# Patient Record
Sex: FEMALE | Race: WHITE | Employment: OTHER | URBAN - METROPOLITAN AREA
[De-identification: names, ages, dates, MRNs, and addresses within clinical notes are randomized per-mention and may not be internally consistent; named-entity substitution may affect disease eponyms.]

---

## 2018-09-02 ENCOUNTER — APPOINTMENT (OUTPATIENT)
Dept: GENERAL RADIOLOGY | Age: 83
End: 2018-09-02
Attending: EMERGENCY MEDICINE
Payer: MEDICARE

## 2018-09-02 ENCOUNTER — HOSPITAL ENCOUNTER (EMERGENCY)
Age: 83
Discharge: ANOTHER HOSPITAL | End: 2018-09-02
Attending: EMERGENCY MEDICINE
Payer: MEDICARE

## 2018-09-02 ENCOUNTER — APPOINTMENT (OUTPATIENT)
Dept: CT IMAGING | Age: 83
End: 2018-09-02
Attending: EMERGENCY MEDICINE
Payer: MEDICARE

## 2018-09-02 VITALS
HEIGHT: 64 IN | DIASTOLIC BLOOD PRESSURE: 67 MMHG | HEART RATE: 67 BPM | TEMPERATURE: 97.9 F | RESPIRATION RATE: 15 BRPM | OXYGEN SATURATION: 100 % | WEIGHT: 118 LBS | SYSTOLIC BLOOD PRESSURE: 175 MMHG | BODY MASS INDEX: 20.14 KG/M2

## 2018-09-02 DIAGNOSIS — I10 HYPERTENSION, UNSPECIFIED TYPE: ICD-10-CM

## 2018-09-02 DIAGNOSIS — R42 DIZZINESS: Primary | ICD-10-CM

## 2018-09-02 DIAGNOSIS — I71.20 THORACIC AORTIC ANEURYSM WITHOUT RUPTURE: ICD-10-CM

## 2018-09-02 LAB
ALBUMIN SERPL-MCNC: 4 G/DL (ref 3.4–5)
ALBUMIN/GLOB SERPL: 1.1 {RATIO} (ref 0.8–1.7)
ALP SERPL-CCNC: 79 U/L (ref 45–117)
ALT SERPL-CCNC: 15 U/L (ref 13–56)
ANION GAP SERPL CALC-SCNC: 11 MMOL/L (ref 3–18)
APPEARANCE UR: CLEAR
AST SERPL-CCNC: 14 U/L (ref 15–37)
BASOPHILS # BLD: 0 K/UL (ref 0–0.1)
BASOPHILS NFR BLD: 0 % (ref 0–2)
BILIRUB SERPL-MCNC: 0.8 MG/DL (ref 0.2–1)
BILIRUB UR QL: NEGATIVE
BUN SERPL-MCNC: 9 MG/DL (ref 7–18)
BUN/CREAT SERPL: 11 (ref 12–20)
CALCIUM SERPL-MCNC: 9.3 MG/DL (ref 8.5–10.1)
CHLORIDE SERPL-SCNC: 98 MMOL/L (ref 100–108)
CK MB CFR SERPL CALC: 5.3 % (ref 0–4)
CK MB SERPL-MCNC: 3.2 NG/ML (ref 5–25)
CK SERPL-CCNC: 60 U/L (ref 26–192)
CO2 SERPL-SCNC: 26 MMOL/L (ref 21–32)
COLOR UR: YELLOW
CREAT SERPL-MCNC: 0.84 MG/DL (ref 0.6–1.3)
DIFFERENTIAL METHOD BLD: ABNORMAL
EOSINOPHIL # BLD: 0 K/UL (ref 0–0.4)
EOSINOPHIL NFR BLD: 0 % (ref 0–5)
ERYTHROCYTE [DISTWIDTH] IN BLOOD BY AUTOMATED COUNT: 14.3 % (ref 11.6–14.5)
GLOBULIN SER CALC-MCNC: 3.7 G/DL (ref 2–4)
GLUCOSE SERPL-MCNC: 134 MG/DL (ref 74–99)
GLUCOSE UR STRIP.AUTO-MCNC: NEGATIVE MG/DL
HCT VFR BLD AUTO: 41.9 % (ref 35–45)
HGB BLD-MCNC: 13.9 G/DL (ref 12–16)
HGB UR QL STRIP: NEGATIVE
KETONES UR QL STRIP.AUTO: NEGATIVE MG/DL
LEUKOCYTE ESTERASE UR QL STRIP.AUTO: NEGATIVE
LYMPHOCYTES # BLD: 1.1 K/UL (ref 0.9–3.6)
LYMPHOCYTES NFR BLD: 17 % (ref 21–52)
MAGNESIUM SERPL-MCNC: 1.9 MG/DL (ref 1.6–2.6)
MCH RBC QN AUTO: 32 PG (ref 24–34)
MCHC RBC AUTO-ENTMCNC: 33.2 G/DL (ref 31–37)
MCV RBC AUTO: 96.3 FL (ref 74–97)
MONOCYTES # BLD: 0.4 K/UL (ref 0.05–1.2)
MONOCYTES NFR BLD: 7 % (ref 3–10)
NEUTS SEG # BLD: 4.9 K/UL (ref 1.8–8)
NEUTS SEG NFR BLD: 76 % (ref 40–73)
NITRITE UR QL STRIP.AUTO: NEGATIVE
PH UR STRIP: 6 [PH] (ref 5–8)
PLATELET # BLD AUTO: 269 K/UL (ref 135–420)
PMV BLD AUTO: 9.6 FL (ref 9.2–11.8)
POTASSIUM SERPL-SCNC: 4.2 MMOL/L (ref 3.5–5.5)
PROT SERPL-MCNC: 7.7 G/DL (ref 6.4–8.2)
PROT UR STRIP-MCNC: NEGATIVE MG/DL
RBC # BLD AUTO: 4.35 M/UL (ref 4.2–5.3)
SODIUM SERPL-SCNC: 135 MMOL/L (ref 136–145)
SP GR UR REFRACTOMETRY: 1.03 (ref 1–1.03)
TROPONIN I SERPL-MCNC: 0.05 NG/ML (ref 0–0.06)
UROBILINOGEN UR QL STRIP.AUTO: 0.2 EU/DL (ref 0.2–1)
WBC # BLD AUTO: 6.4 K/UL (ref 4.6–13.2)

## 2018-09-02 PROCEDURE — 80053 COMPREHEN METABOLIC PANEL: CPT | Performed by: EMERGENCY MEDICINE

## 2018-09-02 PROCEDURE — 74011636320 HC RX REV CODE- 636/320: Performed by: EMERGENCY MEDICINE

## 2018-09-02 PROCEDURE — 82550 ASSAY OF CK (CPK): CPT | Performed by: EMERGENCY MEDICINE

## 2018-09-02 PROCEDURE — 85025 COMPLETE CBC W/AUTO DIFF WBC: CPT | Performed by: EMERGENCY MEDICINE

## 2018-09-02 PROCEDURE — 71045 X-RAY EXAM CHEST 1 VIEW: CPT

## 2018-09-02 PROCEDURE — 93005 ELECTROCARDIOGRAM TRACING: CPT

## 2018-09-02 PROCEDURE — 74011000250 HC RX REV CODE- 250: Performed by: EMERGENCY MEDICINE

## 2018-09-02 PROCEDURE — 74011000258 HC RX REV CODE- 258: Performed by: EMERGENCY MEDICINE

## 2018-09-02 PROCEDURE — 71275 CT ANGIOGRAPHY CHEST: CPT

## 2018-09-02 PROCEDURE — 96374 THER/PROPH/DIAG INJ IV PUSH: CPT

## 2018-09-02 PROCEDURE — 96376 TX/PRO/DX INJ SAME DRUG ADON: CPT

## 2018-09-02 PROCEDURE — 81003 URINALYSIS AUTO W/O SCOPE: CPT | Performed by: EMERGENCY MEDICINE

## 2018-09-02 PROCEDURE — 99285 EMERGENCY DEPT VISIT HI MDM: CPT

## 2018-09-02 PROCEDURE — 83735 ASSAY OF MAGNESIUM: CPT | Performed by: EMERGENCY MEDICINE

## 2018-09-02 RX ORDER — LABETALOL HYDROCHLORIDE 5 MG/ML
10 INJECTION, SOLUTION INTRAVENOUS
Status: COMPLETED | OUTPATIENT
Start: 2018-09-02 | End: 2018-09-02

## 2018-09-02 RX ORDER — LABETALOL HYDROCHLORIDE 5 MG/ML
20 INJECTION, SOLUTION INTRAVENOUS
Status: COMPLETED | OUTPATIENT
Start: 2018-09-02 | End: 2018-09-02

## 2018-09-02 RX ADMIN — IOPAMIDOL 100 ML: 755 INJECTION, SOLUTION INTRAVENOUS at 13:59

## 2018-09-02 RX ADMIN — LABETALOL HYDROCHLORIDE 2 MG/MIN: 5 INJECTION INTRAVENOUS at 17:02

## 2018-09-02 RX ADMIN — LABETALOL HYDROCHLORIDE 10 MG: 5 INJECTION, SOLUTION INTRAVENOUS at 15:58

## 2018-09-02 RX ADMIN — LABETALOL HYDROCHLORIDE 20 MG: 5 INJECTION, SOLUTION INTRAVENOUS at 16:27

## 2018-09-02 NOTE — ED PROVIDER NOTES
EMERGENCY DEPARTMENT HISTORY AND PHYSICAL EXAM 
 
Date: 9/2/2018 Patient Name: Lani Woods History of Presenting Illness Chief Complaint Patient presents with  Dizziness  Jaw Pain History Provided By: Patient Chief Complaint: Facial pain Duration: 2 Hours Timing:  Intermittent Location: Neurological system, right side of face Quality: Stinging Severity: 4/10 Modifying Factors: No relieving or worsening factors Associated Symptoms: dizziness, jitteriness, and chills Additional History (Context):  
11:39 AM 
Lani Woods is a [de-identified] y.o. female with PMHX of arthritis who presents to the emergency department with her daughter C/O \"feeling strange\" with right-sided facial \"stinging,\" onset this morning s/p waking up and experiencing a near-syncopal episode. Associated sxs include dizziness, jitteriness, resolved diaphoresis, and chills. Pt's daughter reports that the pt's facial symmetry appears to be at baseline. Pt reports that she only takes Tylenol and ASA as needed. Denies taking any medications today. Denies hx of cardiac/pulmonary issues. Pt denies CP, SOB, and any other sxs or complaints. PCP: Franc Novak MD 
 
 
 
Past History Past Medical History: 
Past Medical History:  
Diagnosis Date  RA (rheumatoid arthritis) ([facility] [address] Past Surgical History: No past surgical history on file. Family History: No family history on file. Social History: 
Social History Substance Use Topics  Smoking status: Not on file  Smokeless tobacco: Not on file  Alcohol use Not on file Allergies: 
No Known Allergies Review of Systems Review of Systems Constitutional: Positive for chills and diaphoresis. HENT:  
     Right sided facial \"stinging\" Respiratory: Negative for shortness of breath. Cardiovascular: Negative for chest pain. Neurological: Positive for dizziness. Negative for weakness and numbness. All other systems reviewed and are negative. Physical Exam  
 
Vitals:  
 09/02/18 1558 09/02/18 1624 09/02/18 1627 09/02/18 1705 BP: (!) 215/70 198/73 198/73 175/67 Pulse: 99 95 93 67 Resp:  14  15 Temp:      
SpO2:  99%  100% Weight:      
Height:      
 
Physical Exam  
Nursing note and vitals reviewed. Constitutional: Elderly and frail, mild distress Head: Normocephalic, Atraumatic Eyes: Pupils are equal, round, and reactive to light, EOMI Neck: Supple, non-tender Cardiovascular: Irregularly irregular rate and rhythm, no murmurs, rubs, or gallops Chest: Normal work of breathing and chest excursion bilaterally Lungs: Clear to ausculation bilaterally Abdomen: Soft, non tender, non distended, normoactive bowel sounds Back: No evidence of trauma or deformity Extremities: No evidence of trauma or deformity, no LE edema Skin: Warm and dry, normal cap refill Neuro: Alert and appropriate, CN intact, normal speech, strength and sensation full and symmetric bilaterally, normal gait, normal coordination Psychiatric: Anxious mood and affect Diagnostic Study Results Labs - Recent Results (from the past 12 hour(s)) EKG, 12 LEAD, INITIAL Collection Time: 09/02/18 11:39 AM  
Result Value Ref Range Ventricular Rate 97 BPM  
 Atrial Rate 87 BPM  
 P-R Interval 232 ms QRS Duration 86 ms  
 Q-T Interval 380 ms QTC Calculation (Bezet) 482 ms Calculated P Axis 88 degrees Calculated R Axis -39 degrees Calculated T Axis 81 degrees Diagnosis Sinus rhythm with 1st degree AV block with premature atrial complexes and  
premature ventricular complexes or fusion complexes Left axis deviation T wave abnormality, consider lateral ischemia Prolonged QT Abnormal ECG No previous ECGs available CBC WITH AUTOMATED DIFF Collection Time: 09/02/18 11:55 AM  
Result Value Ref Range WBC 6.4 4.6 - 13.2 K/uL  
 RBC 4.35 4.20 - 5.30 M/uL  
 HGB 13.9 12.0 - 16.0 g/dL HCT 41.9 35.0 - 45.0 % MCV 96.3 74.0 - 97.0 FL  
 MCH 32.0 24.0 - 34.0 PG  
 MCHC 33.2 31.0 - 37.0 g/dL  
 RDW 14.3 11.6 - 14.5 % PLATELET 766 635 - 123 K/uL MPV 9.6 9.2 - 11.8 FL  
 NEUTROPHILS 76 (H) 40 - 73 % LYMPHOCYTES 17 (L) 21 - 52 % MONOCYTES 7 3 - 10 % EOSINOPHILS 0 0 - 5 % BASOPHILS 0 0 - 2 %  
 ABS. NEUTROPHILS 4.9 1.8 - 8.0 K/UL  
 ABS. LYMPHOCYTES 1.1 0.9 - 3.6 K/UL  
 ABS. MONOCYTES 0.4 0.05 - 1.2 K/UL  
 ABS. EOSINOPHILS 0.0 0.0 - 0.4 K/UL  
 ABS. BASOPHILS 0.0 0.0 - 0.1 K/UL  
 DF AUTOMATED METABOLIC PANEL, COMPREHENSIVE Collection Time: 09/02/18 11:55 AM  
Result Value Ref Range Sodium 135 (L) 136 - 145 mmol/L Potassium 4.2 3.5 - 5.5 mmol/L Chloride 106 100 - 108 mmol/L  
 CO2 26 21 - 32 mmol/L Anion gap 3 3.0 - 18 mmol/L Glucose 134 (H) 74 - 99 mg/dL BUN 9 7.0 - 18 MG/DL Creatinine 0.84 0.6 - 1.3 MG/DL  
 BUN/Creatinine ratio 11 (L) 12 - 20 GFR est AA >60 >60 ml/min/1.73m2 GFR est non-AA >60 >60 ml/min/1.73m2 Calcium 9.3 8.5 - 10.1 MG/DL Bilirubin, total 0.8 0.2 - 1.0 MG/DL  
 ALT (SGPT) 15 13 - 56 U/L  
 AST (SGOT) 14 (L) 15 - 37 U/L Alk. phosphatase 79 45 - 117 U/L Protein, total 7.7 6.4 - 8.2 g/dL Albumin 4.0 3.4 - 5.0 g/dL Globulin 3.7 2.0 - 4.0 g/dL A-G Ratio 1.1 0.8 - 1.7 MAGNESIUM Collection Time: 09/02/18 11:55 AM  
Result Value Ref Range Magnesium 1.9 1.6 - 2.6 mg/dL CARDIAC PANEL,(CK, CKMB & TROPONIN) Collection Time: 09/02/18 11:55 AM  
Result Value Ref Range CK 60 26 - 192 U/L  
 CK - MB 3.2 <3.6 ng/ml CK-MB Index 5.3 (H) 0.0 - 4.0 % Troponin-I, Qt. 0.05 0.00 - 0.06 NG/ML  
URINALYSIS W/ RFLX MICROSCOPIC Collection Time: 09/02/18  2:20 PM  
Result Value Ref Range Color YELLOW Appearance CLEAR Specific gravity 1.026 1.005 - 1.030    
 pH (UA) 6.0 5.0 - 8.0 Protein NEGATIVE  NEG mg/dL Glucose NEGATIVE  NEG mg/dL Ketone NEGATIVE  NEG mg/dL Bilirubin NEGATIVE  NEG Blood NEGATIVE  NEG Urobilinogen 0.2 0.2 - 1.0 EU/dL Nitrites NEGATIVE  NEG Leukocyte Esterase NEGATIVE  NEG Radiologic Studies -  
CTA CHEST ABD PELV W CONT Final Result IMPRESSION: 
 
 
1. Aneurysmal dilatation of the ascending thoracic aorta without evidence of 
acute aortic pathology. Specifically, no evidence of dissection or intramural 
hematoma. Mild atherosclerosis. 2. Incidental simple appearing cystic structure in right adnexa, nonspecific 
measuring up to 3.4 cm. Given patient's age, recommend follow-up nonemergent 
pelvic ultrasound for further characterization. Trace free fluid in the pelvis 
may be physiologic. 3. No acute inflammatory process in the chest, abdomen, or pelvis. As read by the radiologist.  
XR CHEST PORT Final Result Impression: 
Cardiomegaly which has developed since the remote study from 2006. No acute 
cardiopulmonary abnormality. As read by the radiologist.  
 
CT Results  (Last 48 hours) 09/02/18 1402  CTA CHEST ABD PELV W CONT Final result Impression:  IMPRESSION:  
   
   
1. Aneurysmal dilatation of the ascending thoracic aorta without evidence of  
acute aortic pathology. Specifically, no evidence of dissection or intramural  
hematoma. Mild atherosclerosis. 2. Incidental simple appearing cystic structure in right adnexa, nonspecific  
measuring up to 3.4 cm. Given patient's age, recommend follow-up nonemergent  
pelvic ultrasound for further characterization. Trace free fluid in the pelvis  
may be physiologic. 3. No acute inflammatory process in the chest, abdomen, or pelvis. Narrative:  CTA  aorta dissection History:  chest pain, suspected aortic aneurysm or dissection CT A technique:   
   
Initial noncontrast CT through the chest abdomen and pelvis was performed. Serial axial helical thin section high bolus rate contrast enhanced CT performed from above the aortic arch to the abdomen and pelvis with  iodine containing  
nonionic IV contrast. Sagittal and coronal thin slice and MIP reformations were  
also performed from axial data to best evaluate the aorta and its longitudinal  
relationship to other critical vascular structures and branches. Protocol  
designed to optimally evaluate the thoracic aorta in this setting of trauma or  
suspected dissection. One or more dose reduction techniques were used on this CT: automated exposure control, adjustment of the mAs and/or kVp according to  
patient's size, and iterative reconstruction techniques. The specific techniques  
utilized on this CT exam have been documented in the patient's electronic  
medical record. CTA findings:  
   
No evidence of mediastinal or intramural hematoma. Mild coronary artery  
calcifications are noted in addition to faint calcifications along the aortic  
valve plane and mitral valve annulus. Minimal aortic atherosclerotic  
calcifications are present. Aneurysmal dilatation of the ascending aorta  
measures 5.1 x 4.9 cm. The descending thoracic aorta measures 2.7 cm at the same  
location. There is no abnormal wall thickening, filling defects, or dissection  
flap within the aorta. The aorta is tortuous as are the origins of the great  
vessels. The major vasculature is patent. Heart is enlarged without pericardial  
effusion. Suboptimal opacification of the pulmonary arteries and right heart  
however, diameter of the main pulmonary artery is within normal limits.  
   
 ==========  
   
ADDITIONAL FINDINGS:  
   
------------------  
   
CHEST:  
   
LUNGS: No suspicious pulmonary nodule/mass/opacity. Mild dependent atelectasis  
predominantly in the left lung base and lingula related to the enlarged heart  
and tortuous aorta. Small band of subsegmental atelectasis in the right middle  
lobe is also noted PLEURA: Normal.  
   
AIRWAY: Normal.  
   
 MEDIASTINUM: Normal heart and great vessels. Multiple calcified subcarinal and  
right perihilar mediastinal lymph nodes are noted compatible with chronic  
granulomatous disease. OTHER: No aggressive appearing osseous lesion.  
   
------------------  
   
ABDOMEN/PELVIS:  
   
LIVER/BILIARY: No suspicious liver lesion. No biliary dilation. Gallbladder  
unremarkable. SPLEEN: Normal.  
   
PANCREAS: Prominent main pancreatic duct is noted measuring 2-3 mm. No discrete  
mass lesion is appreciated. ADRENALS: Normal.  
   
KIDNEYS: Normal.  
   
LYMPH NODES: No technically enlarged nodes. GI TRACT: No wall thickening or dilation. Incidental duodenal diverticulum is  
air-filled. VASCULATURE: Major vessels are patent. Tortuous aorta and iliac vessels. Mild  
scattered atherosclerosis. PELVIC ORGANS: Incidental 3.4 x 2.6 cm simple appearing cystic structure in  
right adnexa may be ovarian in etiology. Trace free fluid in the pelvis. OTHER: No aggressive appearing osseous lesion. Bones are osteopenic. Degenerative changes are noted throughout the spine and pelvis. Mild levoconvex  
curvature of the thoracolumbar spine centered at T12-L1. There is an osseous  
hemangioma in the T11 vertebral body. Prominent Schmorl's node along the  
superior endplate of L2.  
   
==========  
   
  
  
 
CXR Results  (Last 48 hours) 09/02/18 1205  XR CHEST PORT Final result Impression:  Impression:  
Cardiomegaly which has developed since the remote study from 2006. No acute  
cardiopulmonary abnormality. Narrative:  Chest, single view Indication: Arrhythmia Comparison: July 27, 2006 Findings:  Portable upright AP view of the chest was obtained. The  
cardiomediastinal silhouette is enlarged. The lungs are well expanded and clear. The pulmonary vasculature is unremarkable. No pleural effusion nor  
pneumothorax. No acute osseous abnormality. 1:08 PM 
RADIOLOGY FINDINGS Chest X-ray shows poor penetration but NAP. Mediastinum looked wider than prior. Pending review by Radiologist 
Recorded by Palak Mcnally, ED Scribe, as dictated by Marychuy Vaughn MD 
 
Medications given in the ED- Medications  
labetalol (NORMODYNE;TRANDATE) 300 mg in 0.9% sodium chloride 150 mL (2 mg / 1 mL) infusion (2 mg/min IntraVENous New Bag 9/2/18 1702) iopamidol (ISOVUE-370) 76 % injection  mL (100 mL IntraVENous Given 9/2/18 1359)  
labetalol (NORMODYNE;TRANDATE) injection 10 mg (10 mg IntraVENous Given 9/2/18 1558)  
labetalol (NORMODYNE;TRANDATE) injection 20 mg (20 mg IntraVENous Given 9/2/18 1627) Medical Decision Making I am the first provider for this patient. I reviewed the vital signs, available nursing notes, past medical history, past surgical history, family history and social history. Vital Signs-Reviewed the patient's vital signs. Pulse Oximetry Analysis - 99% on RA  
 
EKG interpretation: (Preliminary) 11:37 AM  
97 bpm. Sinus rhythm with 1st degree AV block. QRS duration at 86 ms. EKG read by Marychuy Vaughn MD at 11:44 AM  
 
Records Reviewed: Nursing Notes and Old Medical Records Procedures: 
Procedures ED Course:  
11:39 AM Initial assessment performed. The patients presenting problems have been discussed, and they are in agreement with the care plan formulated and outlined with them. I have encouraged them to ask questions as they arise throughout their visit. 2:12 PM Discussed patient's history, exam, and available diagnostics results with Cherri Menezes MD, cardiology, who will evaluate EKG. 2:20 PM Discussed patient's history, exam, and available diagnostics results with Kole Hill MD, cardiology, who agree with checking electrolytes. EKG shows only PVCs and some ectopy.  
 
3:38 PM Discussed patient's history, exam, and available diagnostics results with Dr. Mainor Cramer, vascular surgery, who recommends consulting with cardiothoracic surgey. 3:50 PM Discussed patient's history, exam, and available diagnostics results with Dr. Randa Nance, cardiothoracic surgery, who agrees with transfer to [place] for heart rate control. Will discuss with hospitalist. 
 
Diagnosis and Disposition DISCUSSION: [de-identified] y/o female presenting w/ jaw pain and near syncopal episode found to be hypertensive with a wide mediastinum on CXR and a CTA that revealed thoracic aortic aneurysm. Initiated IV blood pressure control and discussed with cardoithoracic surgery at [place] who recommended transfer for blood pressure control. Pt remains asymtomatic and understands and agrees with plan for transfer to [place]. CONSULT NOTE:  
4:25 PM 
Merry Nichols MD spoke with Doni Hernandez MD 
Specialty: Hospitalist 
Discussed pt's hx, disposition, and available diagnostic and imaging results. Reviewed care plans. Consulting physician agrees with plans as outlined. Will admit pt to CCU. Written by Joe Tejeda ED Scribe, as dictated by Merry Nichols MD 
 
ADMISSION NOTE: 
4:25 PM 
Patient is being admitted to the hospital by Doni Hernandez MD. The results of their tests and reasons for their admission have been discussed with them and/or available family. They convey agreement and understanding for the need to be admitted and for their admission diagnosis. Written by Joe Tejeda ED Scribe, as dictated by Merry Nichols MD 
 
CONDITIONS ON ADMISSION: 
Sepsis is not present at the time of admission. Deep Vein Thrombosis is not present at the time of admission. Thrombosis is not present at the time of admission. Urinary Tract Infection is not present at the time of admission. Pneumonia is not present at the time of admission. MRSA is not present at the time of admission. Wound infection is not present at the time of admission. Pressure Ulcer is not present at the time of admission. 4:28 PM 
I have spent 54 minutes of critical care time involved in lab review, consultations with specialist, family decision-making, and documentation. During this entire length of time I was immediately available to the patient. Critical Care: The reason for providing this level of medical care for this critically ill patient was due a critical illness that impaired one or more vital organ systems such that there was a high probability of imminent or life threatening deterioration in the patients condition. This care involved high complexity decision making to assess, manipulate, and support vital system functions, to treat this degreee vital organ system failure and to prevent further life threatening deterioration of the patients condition. CLINICAL IMPRESSION 1. Dizziness 2. Thoracic aortic aneurysm without rupture ([address] 3. Hypertension, unspecified type   
 
_______________________________ Attestations: This note is prepared by Elyse Hooker, acting as Scribe for Stan Lambert MD. Stan Lambert MD:  The scribe's documentation has been prepared under my direction and personally reviewed by me in its entirety. I confirm that the note above accurately reflects all work, treatment, procedures, and medical decision making performed by me. 
_______________________________

## 2018-09-02 NOTE — ED NOTES
Pt hourly rounding competed. Safety Pt (x) resting on stretcher with side rails up and call bell in reach on continuous monitoring  
  () in chair 
  () in parents arms. Toileting Pt offered ()Bedpan 
   (x)Assistance to Restroom 
   ()Urinal 
Ongoing Updates Updated on plan of care and status of test results. Pain Management Inquired as to comfort and offered comfort measures: 
  (x) warm blankets 
 () dimmed lights Pt unable to void at this time provided w/ ice water, cleared w/ MD prior to. Pt confirmed she will ring call bell when ready to provide urine sample.

## 2018-09-02 NOTE — ED NOTES
Pt placed in chair in sitting position per request due to chronic lower back pain. Pt reports pain relief while in sitting position. Pt remains on continuous monitoring w/ family at the bedside.

## 2018-09-02 NOTE — ED TRIAGE NOTES
Triage: pt awoke with dizziness this morning, lasted approx 15 minutes. Daughter reports that she was diaphoretic. Pt also complains of right jaw pain.

## 2018-09-03 LAB
ATRIAL RATE: 87 BPM
CALCULATED P AXIS, ECG09: 88 DEGREES
CALCULATED R AXIS, ECG10: -39 DEGREES
CALCULATED T AXIS, ECG11: 81 DEGREES
DIAGNOSIS, 93000: NORMAL
P-R INTERVAL, ECG05: 232 MS
Q-T INTERVAL, ECG07: 380 MS
QRS DURATION, ECG06: 86 MS
QTC CALCULATION (BEZET), ECG08: 482 MS
VENTRICULAR RATE, ECG03: 97 BPM

## 2019-01-19 ENCOUNTER — HOSPITAL ENCOUNTER (EMERGENCY)
Age: 84
Discharge: HOME | End: 2019-01-19
Attending: EMERGENCY MEDICINE
Payer: MEDICARE

## 2019-01-19 VITALS
BODY MASS INDEX: 20.91 KG/M2 | RESPIRATION RATE: 18 BRPM | HEIGHT: 63 IN | OXYGEN SATURATION: 97 % | HEART RATE: 101 BPM | SYSTOLIC BLOOD PRESSURE: 170 MMHG | TEMPERATURE: 99.6 F | WEIGHT: 118 LBS | DIASTOLIC BLOOD PRESSURE: 78 MMHG

## 2019-01-19 DIAGNOSIS — R04.0 EPISTAXIS: Primary | ICD-10-CM

## 2019-01-19 PROCEDURE — 99283 EMERGENCY DEPT VISIT LOW MDM: CPT

## 2019-01-19 NOTE — ED TRIAGE NOTES
C/o nose bleed since approx 2p today. Family member states pt had nosebleed last Saturday, EMS arrived and after approx 45 minutes bleeding stopped then started again and pt was seen at [facility] ED, bleeding stopped with afrin. Pt seen this week by her family doctor.

## 2019-01-19 NOTE — ED PROVIDER NOTES
EMERGENCY DEPARTMENT HISTORY AND PHYSICAL EXAM 
 
Date: 1/19/2019 Patient Name: Peng Forbes History of Presenting Illness Chief Complaint Patient presents with  Epistaxis History Provided By: Patient Chief Complaint: Epistaxis Duration: 1 Weeks Timing:  Intermittent Location: Nose Severity: Moderate Associated Symptoms: denies any other associated signs or symptoms Additional History (Context):  
6:36 PM 
Peng Forbes is a 82 y.o. female with PMHX aortic aneurysm, HTN, and RA presents to the emergency department C/O intermittent epistaxis, onset 1 week ago with most recent episode beginning 4.5 hours ago. No other associated sxs. Daughter states that pt was seen at [facility] 1 week ago for the first episode of epistaxis, which resolved with Afrin, and pt was d/c with PCP f/u. Pt states she was doing fine until the most recent episode of epistaxis began. Pt denies trauma/injury, use of blood thinners including ASA, and any other sxs or complaints. PCP: Katelyn Vanegas MD 
 
Current Outpatient Medications Medication Sig Dispense Refill  LISINOPRIL, BULK, by Does Not Apply route.  METOPROLOL SUCCINATE PO Take  by mouth. Past History Past Medical History: 
Past Medical History:  
Diagnosis Date  Aortic aneurysm ([facility] [address]  At risk for falls  Fall  Hypertension  Nosebleed  RA (rheumatoid arthritis) ([facility] [address] Past Surgical History: 
Past Surgical History:  
Procedure Laterality Date  HX GYN    
 tubal ligation  HX ORTHOPAEDIC    
 total knee replacement  HX TONSILLECTOMY Family History: No family history on file. Social History: 
Social History Tobacco Use  Smoking status: Not on file Substance Use Topics  Alcohol use: Not on file  Drug use: Not on file Allergies: 
No Known Allergies Review of Systems Review of Systems Constitutional: Negative for chills and fever. HENT: Positive for nosebleeds. All other systems reviewed and are negative. Physical Exam  
 
Vitals:  
 01/19/19 1830 01/19/19 1900 BP: 130/81 170/78 Pulse: (!) 101 Resp: 18 Temp: 99.6 °F (37.6 °C) SpO2: 95% 97% Weight: 53.5 kg (118 lb) Height: 5' 3\" (1.6 m) Physical Exam  
Nursing note and vitals reviewed. Vital signs and nursing notes reviewed CONSTITUTIONAL: Alert, in no apparent distress; well-developed; well-nourished. HEAD:  Normocephalic, atraumatic EYES: PERRL; EOM's intact. ENTM: Nose: area of bleeding at [address] plexus on the right; Throat: no erythema or exudate, mucous membranes moist 
Neck:  No JVD, supple without lymphadenopathy RESP: Chest clear, equal breath sounds. CV: S1 and S2 WNL; No murmurs, gallops or rubs. GI: Normal bowel sounds, abdomen soft and non-tender. No masses or organomegaly. : No costo-vertebral angle tenderness. BACK:  Non-tender UPPER EXT:  Normal inspection LOWER EXT: No edema, no calf tenderness. Distal pulses intact. NEURO: CN intact, reflexes 2/4 and sym, strength 5/5 and sym, sensation intact. SKIN: No rashes; Normal for age and stage. PSYCH:  Alert and oriented, normal affect. Diagnostic Study Results Labs - No results found for this or any previous visit (from the past 12 hour(s)). Radiologic Studies - No orders to display CT Results  (Last 48 hours) None CXR Results  (Last 48 hours) None Medical Decision Making I am the first provider for this patient. I reviewed the vital signs, available nursing notes, past medical history, past surgical history, family history and social history. Vital Signs-Reviewed the patient's vital signs. Pulse Oximetry Analysis - 95% on RA Records Reviewed: Nursing Notes Provider Notes (Medical Decision Making): DDX: Epistaxis, anticoagulation Procedures: 
Procedures ED Course: 6:36 PM Initial assessment performed. The patients presenting problems have been discussed, and they are in agreement with the care plan formulated and outlined with them. I have encouraged them to ask questions as they arise throughout their visit. Applied pressure to the nose with clamp. 7:39 PM Reassessed pt. Removed nose pinchers. Pt does not appear to be actively bleeding. 8:05 PM Epistaxis resolved. Pt remained hemostatic 20 minutes after removing clamp. Will discharge home. Discussion: Perry Graff is a 82 y.o. female presenting for recurrent epistaxis with most recent this afternoon. Clamp placed with resolution of epistaxis. Pt remained hemostatic 20 minutes after removing clamp. Pt appropriate for d/c with ENT f/u. Diagnosis and Disposition DISCHARGE NOTE: 
8:05 PM 
Latasha Harvey's  results have been reviewed with her. She has been counseled regarding her diagnosis, treatment, and plan. She verbally conveys understanding and agreement of the signs, symptoms, diagnosis, treatment and prognosis and additionally agrees to follow up as discussed. She also agrees with the care-plan and conveys that all of her questions have been answered. I have also provided discharge instructions for her that include: educational information regarding their diagnosis and treatment, and list of reasons why they would want to return to the ED prior to their follow-up appointment, should her condition change. She has been provided with education for proper emergency department utilization. CLINICAL IMPRESSION: 
 
1. Epistaxis PLAN: 
1. D/C Home 2. Current Discharge Medication List  
  
CONTINUE these medications which have NOT CHANGED Details LISINOPRIL, BULK, by Does Not Apply route. METOPROLOL SUCCINATE PO Take  by mouth. 3.  
Follow-up Information Follow up With Specialties Details Why Contact Info Aleshia Torres MD Otolaryngology, Surgery Schedule an appointment as soon as possible for a visit in 2 days ENT follow up. [address] [id number] 
[phone] [facility] EMERGENCY DEPT Emergency Medicine  As needed, if symptoms worsen [address] 
[facility] [id number] 
[phone]  
  
 
_______________________________ Attestations:  
 
SCRIBE ATTESTATION: 
This note is prepared by Bee Paul, acting as Scribe for Addie Tierney MD. 
 
PROVIDER ATTESTATION: 
Addie Tierney MD: The scribe's documentation has been prepared under my direction and personally reviewed by me in its entirety. I confirm that the note above accurately reflects all work, treatment, procedures, and medical decision making performed by me.  
_______________________________

## 2019-01-20 NOTE — ED NOTES
Pt ambulated from bathroom to room with an even, steady gait. No bleeding/epistaxis observed at this time.

## 2019-01-20 NOTE — DISCHARGE INSTRUCTIONS
Nosebleeds: Care Instructions  Your Care Instructions    Nosebleeds are common, especially if you have colds or allergies. Many things can cause a nosebleed. Some nosebleeds stop on their own with pressure. Others need packing. Some get cauterized (sealed). If you have gauze or other packing materials in your nose, you will need to follow up with your doctor to have the packing removed. You may need more treatment if you get nosebleeds a lot. The doctor has checked you carefully, but problems can develop later. If you notice any problems or new symptoms, get medical treatment right away. Follow-up care is a key part of your treatment and safety. Be sure to make and go to all appointments, and call your doctor if you are having problems. It's also a good idea to know your test results and keep a list of the medicines you take. How can you care for yourself at home? · If you get another nosebleed:  ? Sit up and tilt your head slightly forward. This keeps blood from going down your throat. ? Use your thumb and index finger to pinch your nose shut for 10 minutes. Use a clock. Do not check to see if the bleeding has stopped before the 10 minutes are up. If the bleeding has not stopped, pinch your nose shut for another 10 minutes. ? When the bleeding has stopped, try not to pick, rub, or blow your nose for 12 hours. Avoiding these things helps keep your nose from bleeding again. · If your doctor prescribed antibiotics, take them as directed. Do not stop taking them just because you feel better. You need to take the full course of antibiotics. To prevent nosebleeds  · Do not blow your nose too hard. · Try not to lift or strain after a nosebleed. · Raise your head on a pillow while you sleep. · Put a thin layer of a saline- or water-based nasal gel, such as NasoGel, inside your nose. Put it on the septum, which divides your nostrils. This will prevent dryness that can cause nosebleeds.   · Use a vaporizer or humidifier to add moisture to your bedroom. Follow the directions for cleaning the machine. · Do not use aspirin, ibuprofen (Advil, Motrin), or naproxen (Aleve) for 36 to 48 hours after a nosebleed unless your doctor tells you to. You can use acetaminophen (Tylenol) for pain relief. · Talk to your doctor about stopping any other medicines you are taking. Some medicines may make you more likely to get a nosebleed. · Do not use cold medicines or nasal sprays without first talking to your doctor. They can make your nose dry. When should you call for help? Call 911 anytime you think you may need emergency care. For example, call if:    · You passed out (lost consciousness).    Call your doctor now or seek immediate medical care if:    · You get another nosebleed and your nose is still bleeding after you have applied pressure 3 times for 10 minutes each time (30 minutes total).     · There is a lot of blood running down the back of your throat even after you pinch your nose and tilt your head forward.     · You have a fever.     · You have sinus pain.    Watch closely for changes in your health, and be sure to contact your doctor if:    · You get nosebleeds often, even if they stop.     · You do not get better as expected. Where can you learn more? Go to http://marcela-sherwin.info/. Enter S156 in the search box to learn more about \"Nosebleeds: Care Instructions. \"  Current as of: September 23, 2018  Content Version: 11.9  © 2725-0468 [employer]. Care instructions adapted under license by [employer] (which disclaims liability or warranty for this information). If you have questions about a medical condition or this instruction, always ask your healthcare professional. [address] any warranty or liability for your use of this information.

## 2019-01-26 PROCEDURE — 75810000284 HC CNTRL NASAL HEMORHRAGE SIMPLE

## 2019-01-26 PROCEDURE — 99283 EMERGENCY DEPT VISIT LOW MDM: CPT

## 2019-01-27 ENCOUNTER — HOSPITAL ENCOUNTER (EMERGENCY)
Age: 84
Discharge: HOME | End: 2019-01-27
Attending: EMERGENCY MEDICINE
Payer: MEDICARE

## 2019-01-27 VITALS
HEIGHT: 63 IN | SYSTOLIC BLOOD PRESSURE: 169 MMHG | WEIGHT: 111 LBS | OXYGEN SATURATION: 98 % | BODY MASS INDEX: 19.67 KG/M2 | TEMPERATURE: 98.6 F | DIASTOLIC BLOOD PRESSURE: 95 MMHG

## 2019-01-27 DIAGNOSIS — R04.0 EPISTAXIS: Primary | ICD-10-CM

## 2019-01-27 LAB
ALBUMIN SERPL-MCNC: 3.5 G/DL (ref 3.4–5)
ALBUMIN/GLOB SERPL: 1 {RATIO} (ref 0.8–1.7)
ALP SERPL-CCNC: 76 U/L (ref 45–117)
ALT SERPL-CCNC: 15 U/L (ref 13–56)
ANION GAP SERPL CALC-SCNC: 8 MMOL/L (ref 3–18)
APTT PPP: 30.7 SEC (ref 23–36.4)
AST SERPL-CCNC: 21 U/L (ref 15–37)
BASOPHILS # BLD: 0 K/UL (ref 0–0.1)
BASOPHILS NFR BLD: 1 % (ref 0–2)
BILIRUB SERPL-MCNC: 0.5 MG/DL (ref 0.2–1)
BUN SERPL-MCNC: 9 MG/DL (ref 7–18)
BUN/CREAT SERPL: 17 (ref 12–20)
CALCIUM SERPL-MCNC: 8.6 MG/DL (ref 8.5–10.1)
CHLORIDE SERPL-SCNC: 101 MMOL/L (ref 100–108)
CO2 SERPL-SCNC: 23 MMOL/L (ref 21–32)
CREAT SERPL-MCNC: 0.54 MG/DL (ref 0.6–1.3)
DIFFERENTIAL METHOD BLD: ABNORMAL
EOSINOPHIL # BLD: 0 K/UL (ref 0–0.4)
EOSINOPHIL NFR BLD: 0 % (ref 0–5)
ERYTHROCYTE [DISTWIDTH] IN BLOOD BY AUTOMATED COUNT: 13.2 % (ref 11.6–14.5)
GLOBULIN SER CALC-MCNC: 3.4 G/DL (ref 2–4)
GLUCOSE SERPL-MCNC: 124 MG/DL (ref 74–99)
HCT VFR BLD AUTO: 38.7 % (ref 35–45)
HGB BLD-MCNC: 12.8 G/DL (ref 12–16)
INR PPP: 1.2 (ref 0.8–1.2)
LYMPHOCYTES # BLD: 1 K/UL (ref 0.9–3.6)
LYMPHOCYTES NFR BLD: 14 % (ref 21–52)
MCH RBC QN AUTO: 32.5 PG (ref 24–34)
MCHC RBC AUTO-ENTMCNC: 33.1 G/DL (ref 31–37)
MCV RBC AUTO: 98.2 FL (ref 74–97)
MONOCYTES # BLD: 0.5 K/UL (ref 0.05–1.2)
MONOCYTES NFR BLD: 7 % (ref 3–10)
NEUTS SEG # BLD: 6 K/UL (ref 1.8–8)
NEUTS SEG NFR BLD: 78 % (ref 40–73)
PLATELET # BLD AUTO: 271 K/UL (ref 135–420)
PMV BLD AUTO: 9.7 FL (ref 9.2–11.8)
POTASSIUM SERPL-SCNC: 5.1 MMOL/L (ref 3.5–5.5)
PROT SERPL-MCNC: 6.9 G/DL (ref 6.4–8.2)
PROTHROMBIN TIME: 14.9 SEC (ref 11.5–15.2)
RBC # BLD AUTO: 3.94 M/UL (ref 4.2–5.3)
SODIUM SERPL-SCNC: 132 MMOL/L (ref 136–145)
WBC # BLD AUTO: 7.6 K/UL (ref 4.6–13.2)

## 2019-01-27 PROCEDURE — 85025 COMPLETE CBC W/AUTO DIFF WBC: CPT

## 2019-01-27 PROCEDURE — 74011250637 HC RX REV CODE- 250/637: Performed by: EMERGENCY MEDICINE

## 2019-01-27 PROCEDURE — 80053 COMPREHEN METABOLIC PANEL: CPT

## 2019-01-27 PROCEDURE — 74011000250 HC RX REV CODE- 250: Performed by: EMERGENCY MEDICINE

## 2019-01-27 PROCEDURE — 85730 THROMBOPLASTIN TIME PARTIAL: CPT

## 2019-01-27 PROCEDURE — 85610 PROTHROMBIN TIME: CPT

## 2019-01-27 RX ORDER — LORATADINE 10 MG/1
TABLET ORAL
Qty: 14 TAB | Refills: 0 | Status: SHIPPED | OUTPATIENT
Start: 2019-01-27 | End: 2019-04-05

## 2019-01-27 RX ORDER — CEPHALEXIN 500 MG/1
500 CAPSULE ORAL 3 TIMES DAILY
Qty: 15 CAP | Refills: 0 | Status: SHIPPED | OUTPATIENT
Start: 2019-01-27 | End: 2019-02-01

## 2019-01-27 RX ORDER — SILVER NITRATE 38.21; 12.74 MG/1; MG/1
1 STICK TOPICAL ONCE
Status: COMPLETED | OUTPATIENT
Start: 2019-01-27 | End: 2019-01-27

## 2019-01-27 RX ORDER — CEPHALEXIN 250 MG/1
500 CAPSULE ORAL
Status: COMPLETED | OUTPATIENT
Start: 2019-01-27 | End: 2019-01-27

## 2019-01-27 RX ORDER — DIPHENHYDRAMINE HCL 25 MG
25 CAPSULE ORAL
Status: COMPLETED | OUTPATIENT
Start: 2019-01-27 | End: 2019-01-27

## 2019-01-27 RX ORDER — DIPHENHYDRAMINE HCL 25 MG
CAPSULE ORAL
Qty: 30 CAP | Refills: 0 | Status: SHIPPED | OUTPATIENT
Start: 2019-01-27 | End: 2019-04-05

## 2019-01-27 RX ADMIN — CEPHALEXIN 500 MG: 250 CAPSULE ORAL at 02:44

## 2019-01-27 RX ADMIN — Medication 1 SPRAY: at 00:55

## 2019-01-27 RX ADMIN — DIPHENHYDRAMINE HYDROCHLORIDE 25 MG: 25 CAPSULE ORAL at 02:44

## 2019-01-27 RX ADMIN — SILVER NITRATE APPLICATORS 1 APPLICATOR: 25; 75 STICK TOPICAL at 00:55

## 2019-01-27 NOTE — DISCHARGE INSTRUCTIONS
Nosebleeds: Care Instructions  Your Care Instructions    Nosebleeds are common, especially if you have colds or allergies. Many things can cause a nosebleed. Some nosebleeds stop on their own with pressure. Others need packing. Some get cauterized (sealed). If you have gauze or other packing materials in your nose, you will need to follow up with your doctor to have the packing removed. You may need more treatment if you get nosebleeds a lot. The doctor has checked you carefully, but problems can develop later. If you notice any problems or new symptoms, get medical treatment right away. Follow-up care is a key part of your treatment and safety. Be sure to make and go to all appointments, and call your doctor if you are having problems. It's also a good idea to know your test results and keep a list of the medicines you take. How can you care for yourself at home? · If you get another nosebleed:  ? Sit up and tilt your head slightly forward. This keeps blood from going down your throat. ? Use your thumb and index finger to pinch your nose shut for 10 minutes. Use a clock. Do not check to see if the bleeding has stopped before the 10 minutes are up. If the bleeding has not stopped, pinch your nose shut for another 10 minutes. ? When the bleeding has stopped, try not to pick, rub, or blow your nose for 12 hours. Avoiding these things helps keep your nose from bleeding again. · If your doctor prescribed antibiotics, take them as directed. Do not stop taking them just because you feel better. You need to take the full course of antibiotics. To prevent nosebleeds  · Do not blow your nose too hard. · Try not to lift or strain after a nosebleed. · Raise your head on a pillow while you sleep. · Put a thin layer of a saline- or water-based nasal gel, such as NasoGel, inside your nose. Put it on the septum, which divides your nostrils. This will prevent dryness that can cause nosebleeds.   · Use a vaporizer or humidifier to add moisture to your bedroom. Follow the directions for cleaning the machine. · Do not use aspirin, ibuprofen (Advil, Motrin), or naproxen (Aleve) for 36 to 48 hours after a nosebleed unless your doctor tells you to. You can use acetaminophen (Tylenol) for pain relief. · Talk to your doctor about stopping any other medicines you are taking. Some medicines may make you more likely to get a nosebleed. · Do not use cold medicines or nasal sprays without first talking to your doctor. They can make your nose dry. When should you call for help? Call 911 anytime you think you may need emergency care. For example, call if:    · You passed out (lost consciousness).    Call your doctor now or seek immediate medical care if:    · You get another nosebleed and your nose is still bleeding after you have applied pressure 3 times for 10 minutes each time (30 minutes total).     · There is a lot of blood running down the back of your throat even after you pinch your nose and tilt your head forward.     · You have a fever.     · You have sinus pain.    Watch closely for changes in your health, and be sure to contact your doctor if:    · You get nosebleeds often, even if they stop.     · You do not get better as expected. Where can you learn more? Go to http://marcela-sherwin.info/. Enter S156 in the search box to learn more about \"Nosebleeds: Care Instructions. \"  Current as of: September 23, 2018  Content Version: 11.9  © 6271-3249 [employer]. Care instructions adapted under license by [employer] (which disclaims liability or warranty for this information). If you have questions about a medical condition or this instruction, always ask your healthcare professional. [place] [address] any warranty or liability for your use of this information. Nose Cautery for Nosebleeds: What to Expect at -[address] [place] [address] cautery can help prevent nosebleeds. The doctor uses a chemical swab or an electric current to cauterize the inside of the nose. This seals the blood vessels and builds scar tissue to help prevent more bleeding. For this procedure, your doctor made the inside of your nose numb. After the procedure, you may feel itching and pain in your nose for 3 to 5 days. Over-the-counter pain medicines can help with pain. You may feel like you want to touch, scratch, or pick at the inside of your nose. But doing this may cause more nosebleeds. This care sheet gives you a general idea about how long it will take for you to recover. But each person recovers at a different pace. Follow the steps below to feel better as quickly as possible. How can you care for yourself at home?  [facility] care    · Don't touch the part of your nose that was treated.     · Try not to bump your nose.     · To avoid irritating your nose, do not blow your nose for 2 weeks. Gently wipe it one nostril at a time.     · If you get another nosebleed:  ? Sit up and tilt your head slightly forward. This keeps blood from going down your throat. ? Use your thumb and index finger to pinch your nose shut for 10 minutes. Use a clock. Do not check to see if the bleeding has stopped before the 10 minutes are up. If the bleeding has not stopped, pinch your nose shut for another 10 minutes.     · Apply antibacterial ointment or saline nasal spray to the inside of your nose several times a day for 10 days. This will help keep the area moist.   Activity    · For the first 2 to 3 hours after the procedure:  ? Don't bend over or lift anything heavy. ? Avoid heavy exercise or activity.     · You can do your normal activities when it feels okay to do so. Medicines    · Your doctor will tell you if and when you can restart your medicines.  He or she will also give you instructions about taking any new medicines.     · If you take blood thinners, such as warfarin (Coumadin), clopidogrel (Plavix), or aspirin, be sure to talk to your doctor. He or she will tell you if and when to start taking those medicines again. Make sure that you understand exactly what your doctor wants you to do.     · Be safe with medicines. Read and follow all instructions on the label. ? If the doctor gave you a prescription medicine for pain, take it as prescribed. ? If you are not taking a prescription pain medicine, ask your doctor if you can take an over-the-counter medicine. ? Avoid aspirin and NSAIDs like ibuprofen (Advil, Motrin) and naproxen (Aleve) while your nose is healing. They can increase the risk of bleeding. Follow-up care is a key part of your treatment and safety. Be sure to make and go to all appointments, and call your doctor if you are having problems. It's also a good idea to know your test results and keep a list of the medicines you take. When should you call for help? Call your doctor now or seek immediate medical care if:    · You have pain that does not get better after you take pain medicine.     · You get another nosebleed and your nose is still bleeding after you have pinched your nose shut 3 times for 10 minutes each time (30 minutes total).     · There is a lot of blood running down the back of your throat even after you pinch your nose and tilt your head forward.     · You have a fever.    Watch closely for any changes in your health, and be sure to contact your doctor if:    · You still get nosebleeds often, even if they don't last long.     · You do not get better as expected. Where can you learn more? Go to http://marcela-sherwin.info/. Enter I722 in the search box to learn more about \"Nose Cautery for Nosebleeds: What to Expect at Home. \"  Current as of: September 23, 2018  Content Version: 11.9  © 4256-7770 [employer]. Care instructions adapted under license by [employer] (which disclaims liability or warranty for this information).  If you have questions about a medical condition or this instruction, always ask your healthcare professional. [place] [address] any warranty or liability for your use of this information.

## 2019-01-27 NOTE — ED PROVIDER NOTES
EMERGENCY DEPARTMENT HISTORY AND PHYSICAL EXAM 
 
Date: 1/27/2019 Patient Name: Na Aldana History of Presenting Illness Chief Complaint Patient presents with  Epistaxis History Provided By: Patient Chief Complaint: epistaxis Duration: 3 Hours Timing:  Acute and Constant Location: right nostril Severity: Severe Modifying Factors: not improved with treatment tried at home Associated Symptoms: PND Additional History (Context):  
 
12:35 AM 
 
Na Aldana is a 82 y.o. female with pertinent PMHx of HTN, nosebleeds, and aortic aneurysm presenting ambulatory to the ED c/o acute, constant severe epistaxis, from the right nostril, x ~9:40 PM tonight. Pt states that there was no trauma to her nose, and it was completely spontaneous. Pt states that she has had weekly nosebleeds (every Saturday for the last 3 weeks) and was seen in this ER. Pt states that she has tried the treatments advised at discharge from her previous ER visits, with no relief. Pt notes an associated sx of PND. Pt denies any use of blood thinners or ASA. Pt denies any cocaine use. Pt admits to drinking wine, but denies excessive ETOH use. Pt specifically denies any HA, nasal pain, or fever/chills. PCP: Chelsey Blancas MD 
Pt does not smoke tobacco or do illicit drugs. There are no other complaints, changes, or physical findings at this time. Current Outpatient Medications Medication Sig Dispense Refill  cephALEXin (KEFLEX) 500 mg capsule Take 1 Cap by mouth three (3) times daily for 5 days. 15 Cap 0  
 diphenhydrAMINE (BENADRYL) 25 mg capsule Take 1-2 tabs every 6 hours as needed. 30 Cap 0  
 loratadine (CLARITIN) 10 mg tablet Take 1 tab by mouth daily for seven (7) days. Continue after 7 days only if symptoms are still present. Restart for 7 day intervals if sinus congestion symptoms return. 14 Tab 0  
 LISINOPRIL, BULK, by Does Not Apply route.  METOPROLOL SUCCINATE PO Take  by mouth. Past History Past Medical History: 
Past Medical History:  
Diagnosis Date  Aortic aneurysm ([facility])  At risk for falls  Fall  Hypertension  Nosebleed  RA (rheumatoid arthritis) ([facility]) Past Surgical History: 
Past Surgical History:  
Procedure Laterality Date  HX GYN    
 tubal ligation  HX ORTHOPAEDIC    
 total knee replacement  HX TONSILLECTOMY Family History: 
History reviewed. No pertinent family history. Social History: 
Social History Tobacco Use  Smoking status: Never Smoker  Smokeless tobacco: Never Used Substance Use Topics  Alcohol use: Yes Comment: 2 glasses of wine per day  Drug use: No  
 
 
Allergies: 
No Known Allergies Review of Systems Review of Systems Constitutional: Negative for chills and fever. HENT: Positive for nosebleeds and postnasal drip. Negative for nasal pain Gastrointestinal: Negative for nausea and vomiting. Neurological: Negative for headaches. All other systems reviewed and are negative. Physical Exam  
 
Vitals:  
 01/27/19 0013 BP: (!) 169/95 Temp: 98.6 °F (37 °C) SpO2: 98% Weight: 50.3 kg (111 lb) Height: 5' 3\" (1.6 m) Physical Exam  
Constitutional: She is oriented to person, place, and time. She appears well-developed and well-nourished. No distress. HENT:  
Head: Normocephalic. Right Ear: External ear normal.  
Left Ear: External ear normal.  
Bleeding taking place from the right nare. Nonclotted, persistent oozing. Eyes: Conjunctivae and EOM are normal. Pupils are equal, round, and reactive to light. No scleral icterus. No pallor Neck: Normal range of motion. Neck supple. No JVD present. No tracheal deviation present. No thyromegaly present. Cardiovascular: Normal rate, regular rhythm and normal heart sounds. Pulmonary/Chest: Effort normal and breath sounds normal. No stridor. No respiratory distress. Abdominal: Soft. Bowel sounds are normal. She exhibits no distension. There is no tenderness. There is no rebound and no guarding. Musculoskeletal: Normal range of motion. She exhibits no edema. Lymphadenopathy:  
  She has no cervical adenopathy. Neurological: She is alert and oriented to person, place, and time. She has normal reflexes. No cranial nerve deficit. Coordination normal.  
Skin: Skin is warm and dry. No rash noted. She is not diaphoretic. No erythema. Psychiatric: She has a normal mood and affect. Her behavior is normal. Judgment and thought content normal.  
Nursing note and vitals reviewed. Diagnostic Study Results Labs - Recent Results (from the past 12 hour(s)) CBC WITH AUTOMATED DIFF Collection Time: 01/27/19  1:05 AM  
Result Value Ref Range WBC 7.6 4.6 - 13.2 K/uL  
 RBC 3.94 (L) 4.20 - 5.30 M/uL  
 HGB 12.8 12.0 - 16.0 g/dL HCT 38.7 35.0 - 45.0 % MCV 98.2 (H) 74.0 - 97.0 FL  
 MCH 32.5 24.0 - 34.0 PG  
 MCHC 33.1 31.0 - 37.0 g/dL  
 RDW 13.2 11.6 - 14.5 % PLATELET 223 012 - 210 K/uL MPV 9.7 9.2 - 11.8 FL  
 NEUTROPHILS 78 (H) 40 - 73 % LYMPHOCYTES 14 (L) 21 - 52 % MONOCYTES 7 3 - 10 % EOSINOPHILS 0 0 - 5 % BASOPHILS 1 0 - 2 %  
 ABS. NEUTROPHILS 6.0 1.8 - 8.0 K/UL  
 ABS. LYMPHOCYTES 1.0 0.9 - 3.6 K/UL  
 ABS. MONOCYTES 0.5 0.05 - 1.2 K/UL  
 ABS. EOSINOPHILS 0.0 0.0 - 0.4 K/UL  
 ABS. BASOPHILS 0.0 0.0 - 0.1 K/UL  
 DF AUTOMATED    
PTT Collection Time: 01/27/19  1:05 AM  
Result Value Ref Range aPTT 30.7 23.0 - 36.4 SEC PROTHROMBIN TIME + INR Collection Time: 01/27/19  1:05 AM  
Result Value Ref Range Prothrombin time 14.9 11.5 - 15.2 sec INR 1.2 0.8 - 1.2 METABOLIC PANEL, COMPREHENSIVE Collection Time: 01/27/19  1:05 AM  
Result Value Ref Range Sodium 132 (L) 136 - 145 mmol/L Potassium 5.1 3.5 - 5.5 mmol/L Chloride 101 100 - 108 mmol/L  
 CO2 23 21 - 32 mmol/L  Anion gap 8 3.0 - 18 mmol/L  
 Glucose 124 (H) 74 - 99 mg/dL BUN 9 7.0 - 18 MG/DL Creatinine 0.54 (L) 0.6 - 1.3 MG/DL  
 BUN/Creatinine ratio 17 12 - 20 GFR est AA >60 >60 ml/min/1.73m2 GFR est non-AA >60 >60 ml/min/1.73m2 Calcium 8.6 8.5 - 10.1 MG/DL Bilirubin, total 0.5 0.2 - 1.0 MG/DL  
 ALT (SGPT) 15 13 - 56 U/L  
 AST (SGOT) 21 15 - 37 U/L Alk. phosphatase 76 45 - 117 U/L Protein, total 6.9 6.4 - 8.2 g/dL Albumin 3.5 3.4 - 5.0 g/dL Globulin 3.4 2.0 - 4.0 g/dL A-G Ratio 1.0 0.8 - 1.7 Radiologic Studies - No orders to display Medical Decision Making I am the first provider for this patient. I reviewed the vital signs, available nursing notes, past medical history, past surgical history, family history and social history. Vital Signs-Reviewed the patient's vital signs. Pulse Oximetry Analysis - 98% on RA Records Reviewed: Nursing Notes and Old Medical Records Provider Notes (Medical Decision Making): DDx: recurrent epistaxis with no previous eval for coagulopathy. Possible, but unlikely tumor. Will tx sxs and refer directly to ENT. PROCEDURES: 
EPISTAXIS MANAGEMENT Date/Time: 1/27/2019 1:44 AM 
Performed by: Holly Llanos MD 
Authorized by: Holly Llanos MD  
 
Consent:  
  Consent obtained:  Verbal 
  Consent given by:  Patient Risks discussed:  Bleeding, infection, nasal injury and pain Alternatives discussed:  No treatment and alternative treatment Anesthesia (see MAR for exact dosages): Anesthesia method:  None Procedure details:  
  Treatment site:  R anterior Treatment method:  Silver nitrate Treatment complexity:  Extensive Treatment episode: initial   
Post-procedure details:  
  Assessment:  Bleeding stopped Patient tolerance of procedure: Tolerated well, no immediate complications Comments:  
   Large amount of clot evacuated using saline irrigation, suction, as well as multiple doses of jordy-synephrine. MEDICATIONS GIVEN IN THE ED: 
Medications  
phenylephrine (NEOSYNEPHRINE) 1 % nasal spray 1 Spray (1 Spray Right Nostril Given by Provider 1/27/19 0055) silver nitrate applicators (ARZOL) 1 Applicator (1 Applicator Topical Given by Provider 1/27/19 0055) cephALEXin (KEFLEX) capsule 500 mg (500 mg Oral Given 1/27/19 0244) diphenhydrAMINE (BENADRYL) capsule 25 mg (25 mg Oral Given 1/27/19 0244) ED COURSE:  
12:35 AM  
Initial assessment performed. PROGRESS NOTE: 
2:20 AM 
Pt and/or family have been updated on their results. Pt and/or pt's family are aware of the plan of care and are in agreement. Written by Murray Goodell Wetzel Floor, ED Scribe, as dictated by Nahomi Mena MD. Diagnosis and Disposition DISCHARGE NOTE: 
2:22 AM 
The patient is ready for discharge. The patient's signs, symptoms, diagnosis, and discharge instructions have been discussed and the patient and/or family has conveyed their understanding. The patient and/or family is to follow up as recommended or return to the ER should their symptoms worsen. Plan has been discussed and the patient and/or family is in agreement. Written by Murray Goodell Wetzel Floor, ED Scribe, as dictated by Nahomi Mena MD.  
 
CLINICAL IMPRESSION: 
1. Epistaxis PLAN: 
1. D/C Home 2. Discharge Medication List as of 1/27/2019  2:22 AM  
  
START taking these medications Details  
cephALEXin (KEFLEX) 500 mg capsule Take 1 Cap by mouth three (3) times daily for 5 days. , Normal, Disp-15 Cap, R-0  
  
diphenhydrAMINE (BENADRYL) 25 mg capsule Take 1-2 tabs every 6 hours as needed., Normal, Disp-30 Cap, R-0  
  
loratadine (CLARITIN) 10 mg tablet Take 1 tab by mouth daily for seven (7) days. Continue after 7 days only if symptoms are still present. Restart for 7 day intervals if sinus congestion symptoms return., Print, Disp-14 Tab, R-0  
  
  
CONTINUE these medications which have NOT CHANGED Details LISINOPRIL, BULK, by Does Not Apply route., Historical Med METOPROLOL SUCCINATE PO Take  by mouth., Historical Med 3. Follow-up Information Follow up With Specialties Details Why Contact Info Bayron Tineo MD Otolaryngology, Surgery Schedule an appointment as soon as possible for a visit in 1 day for ENT follow up (Monday afternoon or Tuesday morning) [place] [address] 
[phone] [facility] EMERGENCY DEPT Emergency Medicine  As needed, If symptoms worsen [address] [place] [address] 
[phone]  
  
 
_______________________________ Attestations: This note is prepared by Jackie Garcia, acting as Scribe for Lucía Ngo. Daniel Mooney MD. Lucía Ngo. Daniel Mooney MD:  The scribe's documentation has been prepared under my direction and personally reviewed by me in its entirety. I confirm that the note above accurately reflects all work, treatment, procedures, and medical decision making performed by me. 
_______________________________ Addendum 8:32 AM 
1/28/19 I contacted the office on Monday morning to have office staff aware she would need to be seen later on Monday or Tuesday morning for early f/u from her ED treatment.  
Ольга Avalos MD

## 2019-04-05 ENCOUNTER — APPOINTMENT (OUTPATIENT)
Dept: VASCULAR SURGERY | Age: 84
DRG: 292 | End: 2019-04-05
Attending: EMERGENCY MEDICINE
Payer: MEDICARE

## 2019-04-05 ENCOUNTER — APPOINTMENT (OUTPATIENT)
Dept: NON INVASIVE DIAGNOSTICS | Age: 84
DRG: 292 | End: 2019-04-05
Attending: INTERNAL MEDICINE
Payer: MEDICARE

## 2019-04-05 ENCOUNTER — HOSPITAL ENCOUNTER (INPATIENT)
Age: 84
LOS: 2 days | Discharge: HOME HEALTH | DRG: 292 | End: 2019-04-07
Attending: EMERGENCY MEDICINE | Admitting: INTERNAL MEDICINE
Payer: MEDICARE

## 2019-04-05 ENCOUNTER — APPOINTMENT (OUTPATIENT)
Dept: CT IMAGING | Age: 84
DRG: 292 | End: 2019-04-05
Attending: EMERGENCY MEDICINE
Payer: MEDICARE

## 2019-04-05 ENCOUNTER — APPOINTMENT (OUTPATIENT)
Dept: GENERAL RADIOLOGY | Age: 84
DRG: 292 | End: 2019-04-05
Attending: EMERGENCY MEDICINE
Payer: MEDICARE

## 2019-04-05 DIAGNOSIS — I48.91 ATRIAL FIBRILLATION WITH RVR (HCC): Primary | ICD-10-CM

## 2019-04-05 DIAGNOSIS — M25.561 ARTHRALGIA OF BOTH KNEES: ICD-10-CM

## 2019-04-05 DIAGNOSIS — M25.562 ARTHRALGIA OF BOTH KNEES: ICD-10-CM

## 2019-04-05 DIAGNOSIS — R06.00 DYSPNEA, UNSPECIFIED TYPE: ICD-10-CM

## 2019-04-05 DIAGNOSIS — J90 BILATERAL PLEURAL EFFUSION: ICD-10-CM

## 2019-04-05 PROBLEM — I50.31 ACUTE DIASTOLIC CHF (CONGESTIVE HEART FAILURE) (HCC): Status: ACTIVE | Noted: 2019-04-05

## 2019-04-05 PROBLEM — E87.1 HYPONATREMIA: Status: ACTIVE | Noted: 2019-04-05

## 2019-04-05 PROBLEM — M25.50 ARTHRITIC-LIKE PAIN: Status: ACTIVE | Noted: 2019-04-05

## 2019-04-05 PROBLEM — M06.9 RHEUMATOID ARTHRITIS (HCC): Status: ACTIVE | Noted: 2019-04-05

## 2019-04-05 LAB
ALBUMIN SERPL-MCNC: 3.8 G/DL (ref 3.4–5)
ALBUMIN/GLOB SERPL: 1.2 {RATIO} (ref 0.8–1.7)
ALP SERPL-CCNC: 84 U/L (ref 45–117)
ALT SERPL-CCNC: 28 U/L (ref 13–56)
ANION GAP SERPL CALC-SCNC: 9 MMOL/L (ref 3–18)
APPEARANCE UR: CLEAR
APTT PPP: 31.5 SEC (ref 23–36.4)
AST SERPL-CCNC: 38 U/L (ref 15–37)
BACTERIA URNS QL MICRO: ABNORMAL /HPF
BASOPHILS # BLD: 0 K/UL (ref 0–0.1)
BASOPHILS NFR BLD: 0 % (ref 0–2)
BILIRUB SERPL-MCNC: 1.3 MG/DL (ref 0.2–1)
BILIRUB UR QL: NEGATIVE
BNP SERPL-MCNC: 5837 PG/ML (ref 0–1800)
BUN SERPL-MCNC: 15 MG/DL (ref 7–18)
BUN/CREAT SERPL: 15 (ref 12–20)
CALCIUM SERPL-MCNC: 9.4 MG/DL (ref 8.5–10.1)
CHLORIDE SERPL-SCNC: 91 MMOL/L (ref 100–108)
CK MB CFR SERPL CALC: 4.7 % (ref 0–4)
CK MB SERPL-MCNC: 3.7 NG/ML (ref 5–25)
CK SERPL-CCNC: 79 U/L (ref 26–192)
CO2 SERPL-SCNC: 24 MMOL/L (ref 21–32)
COLOR UR: ABNORMAL
CREAT SERPL-MCNC: 0.98 MG/DL (ref 0.6–1.3)
DIFFERENTIAL METHOD BLD: ABNORMAL
ECHO AO ASC DIAM: 4.79 CM
ECHO AV AREA PEAK VELOCITY: 2 CM2
ECHO AV AREA VTI: 1.7 CM2
ECHO AV MEAN GRADIENT: 4.1 MMHG
ECHO AV PEAK GRADIENT: 8.5 MMHG
ECHO AV PEAK VELOCITY: 145.56 CM/S
ECHO AV REGURGITANT PHT: 419.6 CM
ECHO AV VTI: 29.63 CM
ECHO IVC PROX: 2.59 CM
ECHO LA MAJOR AXIS: 5.21 CM
ECHO LA VOL 2C: 98.88 ML (ref 22–52)
ECHO LA VOL 4C: 42.32 ML (ref 22–52)
ECHO LA VOLUME INDEX A2C: 59.93 ML/M2 (ref 16–28)
ECHO LA VOLUME INDEX A4C: 25.65 ML/M2 (ref 16–28)
ECHO LV E' LATERAL VELOCITY: 12 CM/S
ECHO LV E' SEPTAL VELOCITY: 4 CM/S
ECHO LV EDV A2C: 62.7 ML
ECHO LV EDV A4C: 42.2 ML
ECHO LV EDV BP: 55.4 ML (ref 56–104)
ECHO LV EDV INDEX A4C: 25.6 ML/M2
ECHO LV EDV INDEX BP: 33.6 ML/M2
ECHO LV EDV NDEX A2C: 38 ML/M2
ECHO LV EJECTION FRACTION A2C: 63 %
ECHO LV EJECTION FRACTION A4C: 47 %
ECHO LV EJECTION FRACTION BIPLANE: 56.8 % (ref 55–100)
ECHO LV ESV A2C: 23.2 ML
ECHO LV ESV A4C: 22.2 ML
ECHO LV ESV BP: 24 ML (ref 19–49)
ECHO LV ESV INDEX A2C: 14.1 ML/M2
ECHO LV ESV INDEX A4C: 13.5 ML/M2
ECHO LV ESV INDEX BP: 14.5 ML/M2
ECHO LV INTERNAL DIMENSION DIASTOLIC: 3.99 CM (ref 3.9–5.3)
ECHO LV INTERNAL DIMENSION SYSTOLIC: 2.81 CM
ECHO LV IVSD: 1.49 CM (ref 0.6–0.9)
ECHO LV MASS 2D: 253.1 G (ref 67–162)
ECHO LV MASS INDEX 2D: 153.4 G/M2 (ref 43–95)
ECHO LV POSTERIOR WALL DIASTOLIC: 1.37 CM (ref 0.6–0.9)
ECHO LVOT DIAM: 1.98 CM
ECHO LVOT PEAK GRADIENT: 3.5 MMHG
ECHO LVOT PEAK VELOCITY: 93.19 CM/S
ECHO LVOT VTI: 16.59 CM
ECHO MV A VELOCITY: 1.18 CM/S
ECHO MV AREA PHT: 4.6 CM2
ECHO MV E DECELERATION TIME (DT): 164.1 MS
ECHO MV E VELOCITY: 133.83 CM/S
ECHO MV E/A RATIO: 113.42
ECHO MV E/E' LATERAL: 11.15
ECHO MV E/E' RATIO (AVERAGED): 22.31
ECHO MV E/E' SEPTAL: 33.46
ECHO MV PRESSURE HALF TIME (PHT): 47.6 MS
ECHO PULMONARY ARTERY SYSTOLIC PRESSURE (PASP): 50 MMHG
ECHO PV MAX VELOCITY: 287.16 CM/S
ECHO PV PEAK GRADIENT: 33 MMHG
ECHO PV REGURGITANT MAX VELOCITY: 151.27 CM/S
ECHO RV INTERNAL DIMENSION: 3.28 CM
ECHO TRICUSPID ANNULAR PEAK SYSTOLIC VELOCITY: 14 CM/S
ECHO TV REGURGITANT MAX VELOCITY: 294.04 CM/S
ECHO TV REGURGITANT PEAK GRADIENT: 34.6 MMHG
EOSINOPHIL # BLD: 0 K/UL (ref 0–0.4)
EOSINOPHIL NFR BLD: 0 % (ref 0–5)
EPITH CASTS URNS QL MICRO: ABNORMAL /LPF (ref 0–5)
ERYTHROCYTE [DISTWIDTH] IN BLOOD BY AUTOMATED COUNT: 14 % (ref 11.6–14.5)
GLOBULIN SER CALC-MCNC: 3.2 G/DL (ref 2–4)
GLUCOSE SERPL-MCNC: 150 MG/DL (ref 74–99)
GLUCOSE UR STRIP.AUTO-MCNC: NEGATIVE MG/DL
HCT VFR BLD AUTO: 36 % (ref 35–45)
HGB BLD-MCNC: 11.9 G/DL (ref 12–16)
HGB UR QL STRIP: NEGATIVE
INR PPP: 1.3 (ref 0.8–1.2)
KETONES UR QL STRIP.AUTO: ABNORMAL MG/DL
LACTATE BLD-SCNC: 1.79 MMOL/L (ref 0.4–2)
LACTATE BLD-SCNC: 2.83 MMOL/L (ref 0.4–2)
LEUKOCYTE ESTERASE UR QL STRIP.AUTO: NEGATIVE
LYMPHOCYTES # BLD: 0.9 K/UL (ref 0.9–3.6)
LYMPHOCYTES NFR BLD: 15 % (ref 21–52)
MAGNESIUM SERPL-MCNC: 2.4 MG/DL (ref 1.6–2.6)
MCH RBC QN AUTO: 31.2 PG (ref 24–34)
MCHC RBC AUTO-ENTMCNC: 33.1 G/DL (ref 31–37)
MCV RBC AUTO: 94.5 FL (ref 74–97)
MONOCYTES # BLD: 0.5 K/UL (ref 0.05–1.2)
MONOCYTES NFR BLD: 8 % (ref 3–10)
MUCOUS THREADS URNS QL MICRO: ABNORMAL /LPF
NEUTS SEG # BLD: 4.5 K/UL (ref 1.8–8)
NEUTS SEG NFR BLD: 77 % (ref 40–73)
NITRITE UR QL STRIP.AUTO: NEGATIVE
PH UR STRIP: 5.5 [PH] (ref 5–8)
PHOSPHATE SERPL-MCNC: 4.5 MG/DL (ref 2.5–4.9)
PISA AR MAX VEL: 363.85 CM/S
PLATELET # BLD AUTO: 265 K/UL (ref 135–420)
PMV BLD AUTO: 9.8 FL (ref 9.2–11.8)
POTASSIUM SERPL-SCNC: 5.2 MMOL/L (ref 3.5–5.5)
PROT SERPL-MCNC: 7 G/DL (ref 6.4–8.2)
PROT UR STRIP-MCNC: 30 MG/DL
PROTHROMBIN TIME: 16.3 SEC (ref 11.5–15.2)
RBC # BLD AUTO: 3.81 M/UL (ref 4.2–5.3)
RBC #/AREA URNS HPF: ABNORMAL /HPF (ref 0–5)
SODIUM SERPL-SCNC: 124 MMOL/L (ref 136–145)
SP GR UR REFRACTOMETRY: 1.02 (ref 1–1.03)
TROPONIN I SERPL-MCNC: 0.03 NG/ML (ref 0–0.04)
UROBILINOGEN UR QL STRIP.AUTO: 1 EU/DL (ref 0.2–1)
WBC # BLD AUTO: 5.8 K/UL (ref 4.6–13.2)
WBC URNS QL MICRO: ABNORMAL /HPF (ref 0–5)

## 2019-04-05 PROCEDURE — 99285 EMERGENCY DEPT VISIT HI MDM: CPT

## 2019-04-05 PROCEDURE — 85610 PROTHROMBIN TIME: CPT

## 2019-04-05 PROCEDURE — 83605 ASSAY OF LACTIC ACID: CPT

## 2019-04-05 PROCEDURE — 80053 COMPREHEN METABOLIC PANEL: CPT

## 2019-04-05 PROCEDURE — 96361 HYDRATE IV INFUSION ADD-ON: CPT

## 2019-04-05 PROCEDURE — 83880 ASSAY OF NATRIURETIC PEPTIDE: CPT

## 2019-04-05 PROCEDURE — 74011000250 HC RX REV CODE- 250: Performed by: EMERGENCY MEDICINE

## 2019-04-05 PROCEDURE — 96365 THER/PROPH/DIAG IV INF INIT: CPT

## 2019-04-05 PROCEDURE — 96376 TX/PRO/DX INJ SAME DRUG ADON: CPT

## 2019-04-05 PROCEDURE — 71045 X-RAY EXAM CHEST 1 VIEW: CPT

## 2019-04-05 PROCEDURE — 85730 THROMBOPLASTIN TIME PARTIAL: CPT

## 2019-04-05 PROCEDURE — 85025 COMPLETE CBC W/AUTO DIFF WBC: CPT

## 2019-04-05 PROCEDURE — 65660000000 HC RM CCU STEPDOWN

## 2019-04-05 PROCEDURE — 71275 CT ANGIOGRAPHY CHEST: CPT

## 2019-04-05 PROCEDURE — 83735 ASSAY OF MAGNESIUM: CPT

## 2019-04-05 PROCEDURE — 74011636320 HC RX REV CODE- 636/320: Performed by: EMERGENCY MEDICINE

## 2019-04-05 PROCEDURE — 96367 TX/PROPH/DG ADDL SEQ IV INF: CPT

## 2019-04-05 PROCEDURE — 93005 ELECTROCARDIOGRAM TRACING: CPT

## 2019-04-05 PROCEDURE — 93306 TTE W/DOPPLER COMPLETE: CPT

## 2019-04-05 PROCEDURE — 81001 URINALYSIS AUTO W/SCOPE: CPT

## 2019-04-05 PROCEDURE — 84100 ASSAY OF PHOSPHORUS: CPT

## 2019-04-05 PROCEDURE — 74011250637 HC RX REV CODE- 250/637: Performed by: INTERNAL MEDICINE

## 2019-04-05 PROCEDURE — 87086 URINE CULTURE/COLONY COUNT: CPT

## 2019-04-05 PROCEDURE — 93970 EXTREMITY STUDY: CPT

## 2019-04-05 PROCEDURE — 82550 ASSAY OF CK (CPK): CPT

## 2019-04-05 PROCEDURE — 74011250637 HC RX REV CODE- 250/637: Performed by: EMERGENCY MEDICINE

## 2019-04-05 PROCEDURE — 74011250636 HC RX REV CODE- 250/636: Performed by: EMERGENCY MEDICINE

## 2019-04-05 PROCEDURE — 87040 BLOOD CULTURE FOR BACTERIA: CPT

## 2019-04-05 PROCEDURE — 96366 THER/PROPH/DIAG IV INF ADDON: CPT

## 2019-04-05 PROCEDURE — 74011000258 HC RX REV CODE- 258: Performed by: EMERGENCY MEDICINE

## 2019-04-05 RX ORDER — DILTIAZEM HYDROCHLORIDE 120 MG/1
120 CAPSULE, COATED, EXTENDED RELEASE ORAL DAILY
Status: DISCONTINUED | OUTPATIENT
Start: 2019-04-06 | End: 2019-04-05

## 2019-04-05 RX ORDER — SPIRONOLACTONE 25 MG/1
12.5 TABLET ORAL DAILY
COMMUNITY
End: 2019-04-07

## 2019-04-05 RX ORDER — FUROSEMIDE 10 MG/ML
40 INJECTION INTRAMUSCULAR; INTRAVENOUS DAILY
Status: DISCONTINUED | OUTPATIENT
Start: 2019-04-06 | End: 2019-04-07 | Stop reason: HOSPADM

## 2019-04-05 RX ORDER — ACETAMINOPHEN 325 MG/1
650 TABLET ORAL
Status: DISCONTINUED | OUTPATIENT
Start: 2019-04-05 | End: 2019-04-07 | Stop reason: HOSPADM

## 2019-04-05 RX ORDER — SODIUM CHLORIDE 0.9 % (FLUSH) 0.9 %
5-10 SYRINGE (ML) INJECTION AS NEEDED
Status: DISCONTINUED | OUTPATIENT
Start: 2019-04-05 | End: 2019-04-07 | Stop reason: HOSPADM

## 2019-04-05 RX ORDER — DILTIAZEM HYDROCHLORIDE 120 MG/1
120 CAPSULE, COATED, EXTENDED RELEASE ORAL DAILY
COMMUNITY
End: 2019-04-07

## 2019-04-05 RX ORDER — ENOXAPARIN SODIUM 100 MG/ML
1 INJECTION SUBCUTANEOUS
Status: COMPLETED | OUTPATIENT
Start: 2019-04-05 | End: 2019-04-05

## 2019-04-05 RX ORDER — METOPROLOL TARTRATE 50 MG/1
100 TABLET ORAL 2 TIMES DAILY
Status: DISCONTINUED | OUTPATIENT
Start: 2019-04-05 | End: 2019-04-07 | Stop reason: HOSPADM

## 2019-04-05 RX ORDER — METOPROLOL TARTRATE 100 MG/1
100 TABLET ORAL
COMMUNITY
End: 2019-04-07

## 2019-04-05 RX ORDER — FUROSEMIDE 80 MG/1
80 TABLET ORAL DAILY
COMMUNITY
End: 2019-04-07

## 2019-04-05 RX ORDER — SPIRONOLACTONE 25 MG/1
12.5 TABLET ORAL DAILY
Status: DISCONTINUED | OUTPATIENT
Start: 2019-04-06 | End: 2019-04-07 | Stop reason: HOSPADM

## 2019-04-05 RX ORDER — TRAMADOL HYDROCHLORIDE 50 MG/1
50 TABLET ORAL
Status: COMPLETED | OUTPATIENT
Start: 2019-04-05 | End: 2019-04-05

## 2019-04-05 RX ORDER — DILTIAZEM HYDROCHLORIDE 5 MG/ML
10 INJECTION INTRAVENOUS
Status: COMPLETED | OUTPATIENT
Start: 2019-04-05 | End: 2019-04-05

## 2019-04-05 RX ORDER — DILTIAZEM HYDROCHLORIDE 240 MG/1
240 CAPSULE, COATED, EXTENDED RELEASE ORAL DAILY
Status: DISCONTINUED | OUTPATIENT
Start: 2019-04-06 | End: 2019-04-07 | Stop reason: HOSPADM

## 2019-04-05 RX ORDER — ONDANSETRON 2 MG/ML
4 INJECTION INTRAMUSCULAR; INTRAVENOUS
Status: DISCONTINUED | OUTPATIENT
Start: 2019-04-05 | End: 2019-04-07 | Stop reason: HOSPADM

## 2019-04-05 RX ORDER — ACETAMINOPHEN 325 MG/1
650 TABLET ORAL ONCE
Status: COMPLETED | OUTPATIENT
Start: 2019-04-05 | End: 2019-04-05

## 2019-04-05 RX ADMIN — DILTIAZEM HYDROCHLORIDE 5 MG/HR: 5 INJECTION INTRAVENOUS at 10:39

## 2019-04-05 RX ADMIN — ACETAMINOPHEN 650 MG: 325 TABLET ORAL at 08:05

## 2019-04-05 RX ADMIN — IOPAMIDOL 100 ML: 755 INJECTION, SOLUTION INTRAVENOUS at 10:13

## 2019-04-05 RX ADMIN — TRAMADOL HYDROCHLORIDE 50 MG: 50 TABLET, FILM COATED ORAL at 08:57

## 2019-04-05 RX ADMIN — METOPROLOL TARTRATE 100 MG: 50 TABLET ORAL at 23:01

## 2019-04-05 RX ADMIN — ENOXAPARIN SODIUM 60 MG: 60 INJECTION SUBCUTANEOUS at 13:26

## 2019-04-05 RX ADMIN — DILTIAZEM HYDROCHLORIDE 10 MG: 5 INJECTION INTRAVENOUS at 08:06

## 2019-04-05 RX ADMIN — SODIUM CHLORIDE 1000 ML: 900 INJECTION, SOLUTION INTRAVENOUS at 08:07

## 2019-04-05 RX ADMIN — DILTIAZEM HYDROCHLORIDE 10 MG/HR: 5 INJECTION INTRAVENOUS at 11:03

## 2019-04-05 RX ADMIN — PIPERACILLIN AND TAZOBACTAM 3.38 G: 3; .375 INJECTION, POWDER, FOR SOLUTION INTRAVENOUS at 09:05

## 2019-04-05 NOTE — H&P
History & Physical 
Patient: Jonnie Bundy MRN: [id number]  CSN: [id number] YOB: 1935  Age: 82 y.o. Sex: female DOA: 4/5/2019 Primary Care Provider:  Jaime Damian MD 
 
 
Assessment/Plan 1. Acute decompensated diastolic CHF 2. Atrial fibrillation w rapid ventricular rate 3. Hyponatremia 4. Rheumatoid arthritis 5. Thoracic aortic aneurysm 6. Dementia 7. B12 deficiency PLAN: 
- Admit to medical service with telemetry monitoring 
- diurese w IV lasix 
- fluid restriction 
- update echo 
- continue home dose metoprolol, cardizem, eliquis 
- daily weights - DNR/DNI, on eliquis for DVT ppx 
- cardiology consult Patient Active Problem List  
Diagnosis Code  Dyspnea R06.00  
 Arthritic-like pain M25.50  Atrial fibrillation with RVR ([facility]) I48.91  
 Acute diastolic CHF (congestive heart failure) ([facility]) I50.31  
 Hyponatremia E87.1  Rheumatoid arthritis ([facility] [address] M06.9 HPI:  
CC: shortness of breath Jonnie Bundy is a 82 y.o. female with past medical history significant for atrial fibrillation, chronic diastolic CHF, hyponatremia, rheumatoid arthritis presents to the ER for 2 days of worsening shortness of breath. She states she has orthopnea and PND. She denies chest pain, pressure, palpitations. She noted + Le edema. She has had admissions to [facility] for the same. She voices compliance w her medication regimen, her daughters set up her medication pill box and havent double checked. On presentation to the ER she was in afib w RVR, noted rales on examination and LE edema. CXR w pulmonary edema, CTA neg for PE and + for pulmonary edema, EKG w Afib RVR. Medicine is asked to admit for further management. Past Medical History:  
Diagnosis Date  Aortic aneurysm ([facility] [address]  At risk for falls  Fall  Hypertension  Nosebleed  RA (rheumatoid arthritis) ([facility] [address] Past Surgical History:  
Procedure Laterality Date  HX GYN    
 tubal ligation  HX ORTHOPAEDIC    
 total knee replacement  HX TONSILLECTOMY Social History Tobacco Use  Smoking status: Never Smoker  Smokeless tobacco: Never Used Substance Use Topics  Alcohol use: Yes Comment: 2 glasses of wine per day  Drug use: No  
 
History reviewed. No pertinent family history. No current facility-administered medications on file prior to encounter. Current Outpatient Medications on File Prior to Encounter Medication Sig Dispense Refill  metoprolol tartrate (LOPRESSOR) 100 mg IR tablet Take 100 mg by mouth.  furosemide (LASIX) 80 mg tablet Take 80 mg by mouth daily.  spironolactone (ALDACTONE) 25 mg tablet Take 12.5 mg by mouth daily.  dilTIAZem CD (CARDIZEM CD) 120 mg ER capsule Take 120 mg by mouth daily.  apixaban (ELIQUIS PO) Take 2.5 mg by mouth two (2) times a day. No Known Allergies Review of Systems Constitutional: No fever, chills, diaphoresis, malaise, fatigue or weight gain/loss or falls Skin: no itching or rashes HEENT: no neck stiffness, hearing loss, tinnitus, epistaxis or sore throat EYES: no blurry vision, double vision or photophobia CARDIOVASCULAR: no cp, +palpitations, +orthopnea + pnd + LE edema PULMONARY: no cough, wheeze, +shortness of breath - sputum production GI: no nausea, vomiting, diarrhea, abdominal pain, melena, hematemesis or brbpr : no dysuria, hematuria MUSCULOSKELETAL: no back pain, joint pain or myalgias ENDOCRINE: no heat/cold intolerance, polyuria or polydipsia HEME: no easy bruising or bleeding NEURO: no unilateral weakness, numbness, tingling or seizures Physical Exam:  
  
 
Visit Vitals /66 (BP 1 Location: Left arm, BP Patient Position: Sitting) Pulse 81 Temp 97.5 °F (36.4 °C) Resp 16 Ht 5' 4\" (1.626 m) Wt 60.8 kg (134 lb) SpO2 95% BMI 23.00 kg/m² O2 Flow Rate (L/min): 2 l/min O2 Device: Room air Temp (24hrs), Av.8 °F (36.6 °C), Min:97.5 °F (36.4 °C), Max:98.4 °F (36.9 °C) 701 -  190 In: 1100 [I.V.:1100] Out: -    No intake/output data recorded. Body mass index is 23 kg/m². General:  Awake, cooperative, no distress. Head:  Normocephalic, without obvious abnormality, atraumatic. Eyes:  Conjunctivae/corneas clear, sclera anicteric, PERRL, EOMs intact. Nose: Nares normal. No drainage or sinus tenderness. Throat: Lips, mucosa, and tongue normal. .  
Neck: Supple, symmetrical, trachea midline, no adenopathy. Lungs:   Rales bilaterally, no wheezing, equal expansion noted Heart:  Regular rate and rhythm, S1, S2 normal, no murmur, click, rub or gallop, cap refill normal   
  Abdomen: Soft, non-tender. Bowel sounds normal. No masses,  No organomegaly. Extremities: Extremities normal, atraumatic, no cyanosis ++ edema. Pulses: 2+ and symmetric all extremities. Skin: Skin color pale, texture, turgor normal. No rashes or lesions Neurologic: CNII-XII intact. No focal motor or sensory deficit. Laboratory Studies: 
 
CMP:  
Lab Results Component Value Date/Time  (L) 2019 07:00 AM  
 K 5.2 2019 07:00 AM  
 CL 91 (L) 2019 07:00 AM  
 CO2 24 2019 07:00 AM  
 AGAP 9 2019 07:00 AM  
  (H) 2019 07:00 AM  
 BUN 15 2019 07:00 AM  
 CREA 0.98 2019 07:00 AM  
 GFRAA >60 2019 07:00 AM  
 GFRNA 54 (L) 2019 07:00 AM  
 CA 9.4 2019 07:00 AM  
 ALB 3.8 2019 07:00 AM  
 TP 7.0 2019 07:00 AM  
 GLOB 3.2 2019 07:00 AM  
 AGRAT 1.2 2019 07:00 AM  
 SGOT 38 (H) 2019 07:00 AM  
 ALT 28 2019 07:00 AM  
 
CBC:  
Lab Results Component Value Date/Time WBC 5.8 2019 07:00 AM  
 HGB 11.9 (L) 2019 07:00 AM  
 HCT 36.0 2019 07:00 AM  
  2019 07:00 AM  
 
All Cardiac Markers in the last 24 hours:  
Lab Results Component Value Date/Time CPK 79 04/05/2019 07:00 AM  
 CKMB 3.7 (H) 04/05/2019 07:00 AM  
 CKND1 4.7 (H) 04/05/2019 07:00 AM  
 TROIQ 0.03 04/05/2019 07:00 AM  
 
 
Imaging studies personally reviewed: CTA chest: no PE + pulmonary edema + aneurysm Time spent discussing advance care planning / code status: 16 minutes side exam 
 
Teodora Lung, DO Internal Medicine/Geriatrics

## 2019-04-05 NOTE — ROUTINE PROCESS
TRANSFER - IN REPORT: 
 
Verbal report received from DIRK Amaro (name) on Lani Woods  being received from ED (unit) for routine progression of care Report consisted of patients Situation, Background, Assessment and  
Recommendations(SBAR). Information from the following report(s) SBAR, Kardex, ED Summary, Intake/Output, MAR, Recent Results and Cardiac Rhythm Afib was reviewed with the receiving nurse. Opportunity for questions and clarification was provided. Assessment completed upon patients arrival to unit and care assumed. NO skin issues, telemetry strip verified.

## 2019-04-05 NOTE — ED PROVIDER NOTES
EMERGENCY DEPARTMENT HISTORY AND PHYSICAL EXAM 
 
Date: 4/5/2019 Patient Name: Yuan Zuniga History of Presenting Illness Chief Complaint Patient presents with  Shortness of Breath History Provided By: Patient and EMS Chief Complaint: Shortness of breath Duration: This morning  prior to arrival 
 
 
Additional History (Context): Yuan Zuniga is a 82 y.o. female with PMHX of rheumatoid arthritis, hypertension, aortic aneurysm who presents to the emergency department C/O moderate shortness of breath started this morning prior to arrival.. Associated sxs include bilateral lower extremity pain and swelling. She does state she feels her typical rheumatoid arthritis pain but states that this is chronic and no change. Pt denies chest pain, cough, fever, nausea, abdominal pain, palpitations, and any other sxs or complaints. PCP: Karla Bo MD 
 
Current Facility-Administered Medications Medication Dose Route Frequency Provider Last Rate Last Dose  sodium chloride (NS) flush 5-10 mL  5-10 mL IntraVENous PRN Velma Fragmin, DO      
 dilTIAZem (CARDIZEM) 125 mg in 0.9% sodium chloride 125 mL infusion  10 mg/hr IntraVENous CONTINUOUS Tom Rosa DO 10 mL/hr at 04/05/19 1103 10 mg/hr at 04/05/19 1103  enoxaparin (LOVENOX) injection 60 mg  1 mg/kg SubCUTAneous NOW Velma Fragmin, DO      
 
Current Outpatient Medications Medication Sig Dispense Refill  LISINOPRIL, BULK, by Does Not Apply route.  METOPROLOL SUCCINATE PO Take  by mouth. Past History Past Medical History: 
Past Medical History:  
Diagnosis Date  Aortic aneurysm ([facility] [address]  At risk for falls  Fall  Hypertension  Nosebleed  RA (rheumatoid arthritis) ([facility] [address] Past Surgical History: 
Past Surgical History:  
Procedure Laterality Date  HX GYN    
 tubal ligation  HX ORTHOPAEDIC    
 total knee replacement  HX TONSILLECTOMY Family History: History reviewed. No pertinent family history. Social History: 
Social History Tobacco Use  Smoking status: Never Smoker  Smokeless tobacco: Never Used Substance Use Topics  Alcohol use: Yes Comment: 2 glasses of wine per day  Drug use: No  
 
 
Allergies: 
No Known Allergies Review of Systems Review of Systems Constitutional: Negative for chills and fever. HENT: Negative for congestion, rhinorrhea, sore throat and trouble swallowing. Eyes: Negative for visual disturbance. Respiratory: Positive for shortness of breath. Negative for cough and wheezing. Cardiovascular: Positive for leg swelling. Negative for chest pain and palpitations. Gastrointestinal: Negative for abdominal pain, nausea and vomiting. Endocrine: Negative for polyuria. Genitourinary: Negative for dysuria. Musculoskeletal: Negative for arthralgias and neck stiffness. Bilateral leg pain and swelling Skin: Negative for pallor and rash. Neurological: Negative for dizziness, weakness, numbness and headaches. Hematological: Does not bruise/bleed easily. Psychiatric/Behavioral: Negative for confusion and dysphoric mood. All other systems reviewed and are negative. Physical Exam  
 
Vitals:  
 04/05/19 1130 04/05/19 1200 04/05/19 1230 04/05/19 1245 BP: 126/59 137/67 109/69 150/56 Pulse: 94 93 98 83 Resp: 17 15 17 20 Temp:      
SpO2: 97% 97% 94% 96% Weight:      
Height:      
 
Physical Exam  
Constitutional: She is oriented to person, place, and time. She appears well-developed and well-nourished. No distress. HENT:  
Head: Normocephalic and atraumatic. Mouth/Throat: Oropharynx is clear and moist.  
Eyes: Pupils are equal, round, and reactive to light. Conjunctivae are normal. No scleral icterus. Neck: Normal range of motion. Neck supple. Cardiovascular: Intact distal pulses. Capillary refill < 3 seconds Irregularly irregular, A fib RVR on the monitor, heart rate 130s-140 Pulmonary/Chest: Effort normal and breath sounds normal. No respiratory distress. She has no wheezes. She has no rales. Abdominal: Soft. Bowel sounds are normal. She exhibits no distension. There is no tenderness. Musculoskeletal: Normal range of motion. She exhibits edema. Bilateral lower extremity edema, bilateral calf tenderness Lymphadenopathy:  
  She has no cervical adenopathy. Neurological: She is alert and oriented to person, place, and time. No cranial nerve deficit. Skin: Skin is warm and dry. No rash noted. She is not diaphoretic. Psychiatric: She has a normal mood and affect. Her behavior is normal.  
Nursing note and vitals reviewed. Diagnostic Study Results Labs - Recent Results (from the past 12 hour(s)) CBC WITH AUTOMATED DIFF Collection Time: 04/05/19  7:00 AM  
Result Value Ref Range WBC 5.8 4.6 - 13.2 K/uL  
 RBC 3.81 (L) 4.20 - 5.30 M/uL  
 HGB 11.9 (L) 12.0 - 16.0 g/dL HCT 36.0 35.0 - 45.0 % MCV 94.5 74.0 - 97.0 FL  
 MCH 31.2 24.0 - 34.0 PG  
 MCHC 33.1 31.0 - 37.0 g/dL  
 RDW 14.0 11.6 - 14.5 % PLATELET 648 270 - 039 K/uL MPV 9.8 9.2 - 11.8 FL  
 NEUTROPHILS 77 (H) 40 - 73 % LYMPHOCYTES 15 (L) 21 - 52 % MONOCYTES 8 3 - 10 % EOSINOPHILS 0 0 - 5 % BASOPHILS 0 0 - 2 %  
 ABS. NEUTROPHILS 4.5 1.8 - 8.0 K/UL  
 ABS. LYMPHOCYTES 0.9 0.9 - 3.6 K/UL  
 ABS. MONOCYTES 0.5 0.05 - 1.2 K/UL  
 ABS. EOSINOPHILS 0.0 0.0 - 0.4 K/UL  
 ABS. BASOPHILS 0.0 0.0 - 0.1 K/UL  
 DF AUTOMATED METABOLIC PANEL, COMPREHENSIVE Collection Time: 04/05/19  7:00 AM  
Result Value Ref Range Sodium 124 (L) 136 - 145 mmol/L Potassium 5.2 3.5 - 5.5 mmol/L Chloride 91 (L) 100 - 108 mmol/L  
 CO2 24 21 - 32 mmol/L Anion gap 9 3.0 - 18 mmol/L Glucose 150 (H) 74 - 99 mg/dL BUN 15 7.0 - 18 MG/DL  Creatinine 0.98 0.6 - 1.3 MG/DL  
 BUN/Creatinine ratio 15 12 - 20    
 GFR est AA >60 >60 ml/min/1.73m2 GFR est non-AA 54 (L) >60 ml/min/1.73m2 Calcium 9.4 8.5 - 10.1 MG/DL Bilirubin, total 1.3 (H) 0.2 - 1.0 MG/DL  
 ALT (SGPT) 28 13 - 56 U/L  
 AST (SGOT) 38 (H) 15 - 37 U/L Alk. phosphatase 84 45 - 117 U/L Protein, total 7.0 6.4 - 8.2 g/dL Albumin 3.8 3.4 - 5.0 g/dL Globulin 3.2 2.0 - 4.0 g/dL A-G Ratio 1.2 0.8 - 1.7 CARDIAC PANEL,(CK, CKMB & TROPONIN) Collection Time: 04/05/19  7:00 AM  
Result Value Ref Range CK 79 26 - 192 U/L  
 CK - MB 3.7 (H) <3.6 ng/ml CK-MB Index 4.7 (H) 0.0 - 4.0 % Troponin-I, QT 0.03 0.0 - 0.045 NG/ML  
NT-PRO BNP Collection Time: 04/05/19  7:00 AM  
Result Value Ref Range NT pro-BNP 5,837 (H) 0 - 1,800 PG/ML  
PROTHROMBIN TIME + INR Collection Time: 04/05/19  7:00 AM  
Result Value Ref Range Prothrombin time 16.3 (H) 11.5 - 15.2 sec INR 1.3 (H) 0.8 - 1.2 PTT Collection Time: 04/05/19  7:00 AM  
Result Value Ref Range aPTT 31.5 23.0 - 36.4 SEC POC LACTIC ACID Collection Time: 04/05/19  7:03 AM  
Result Value Ref Range Lactic Acid (POC) 2.83 (HH) 0.40 - 2.00 mmol/L  
EKG, 12 LEAD, INITIAL Collection Time: 04/05/19  7:06 AM  
Result Value Ref Range Ventricular Rate 134 BPM  
 Atrial Rate 337 BPM  
 QRS Duration 88 ms Q-T Interval 350 ms QTC Calculation (Bezet) 522 ms Calculated R Axis -14 degrees Calculated T Axis 114 degrees Diagnosis Atrial flutter with variable AV block Low voltage QRS Abnormal QRS-T angle, consider primary T wave abnormality Abnormal ECG When compared with ECG of 02-SEP-2018 11:39, 
Atrial flutter has replaced Sinus rhythm Nonspecific T wave abnormality has replaced inverted T waves in Lateral leads URINALYSIS W/ RFLX MICROSCOPIC Collection Time: 04/05/19  8:50 AM  
Result Value Ref Range Color DARK YELLOW Appearance CLEAR Specific gravity 1.023 1.005 - 1.030    
 pH (UA) 5.5 5.0 - 8.0 Protein 30 (A) NEG mg/dL Glucose NEGATIVE  NEG mg/dL Ketone TRACE (A) NEG mg/dL Bilirubin NEGATIVE  NEG Blood NEGATIVE  NEG Urobilinogen 1.0 0.2 - 1.0 EU/dL Nitrites NEGATIVE  NEG Leukocyte Esterase NEGATIVE  NEG    
URINE MICROSCOPIC ONLY Collection Time: 04/05/19  8:50 AM  
Result Value Ref Range WBC 0 to 2 0 - 5 /hpf  
 RBC 0 to 1 0 - 5 /hpf Epithelial cells FEW 0 - 5 /lpf Bacteria FEW (A) NEG /hpf Mucus FEW (A) NEG /lpf POC LACTIC ACID Collection Time: 04/05/19 10:35 AM  
Result Value Ref Range Lactic Acid (POC) 1.79 0.40 - 2.00 mmol/L Radiologic Studies -  
CTA CHEST W OR W WO CONT Final Result IMPRESSION:  
  
1. Respiratory motion limited examination without convincing evidence of  
pulmonary embolism. Pulmonary arterial enlargement unchanged, in keeping with  
underlying pulmonary hypertension. 2. Aneurysmal dilatation of the ascending thoracic aorta, similar to prior. 3. Cardiomegaly without evidence of pericardial effusion. Reflux of contrast  
into the infrahepatic IVC as well as predominant opacification of the right  
heart as can be seen in the context of elevated right heart pressures /decreased  
cardiac output. 4. Moderate right and small left pleural effusions. 5. Upper lobe predominant interlobular septal line thickening compatible with  
underlying interstitial edema. Vague area of groundglass opacity within the  
periphery of the left upper lobe obscured by motion artifact, potentially an  
asymmetric manifestation of pulmonary edema versus nonspecific alveolitis. XR CHEST PORT Final Result IMPRESSION:  
  
  
1. Mild cardiac enlargement and small bilateral pleural effusions with  
associated basilar atelectasis. CT Results  (Last 48 hours) 04/05/19 1029  CTA 114[facility] CONT Final result  Impression:  IMPRESSION:  
   
 1.  Respiratory motion limited examination without convincing evidence of  
pulmonary embolism. Pulmonary arterial enlargement unchanged, in keeping with  
underlying pulmonary hypertension. 2. Aneurysmal dilatation of the ascending thoracic aorta, similar to prior. 3. Cardiomegaly without evidence of pericardial effusion. Reflux of contrast  
into the infrahepatic IVC as well as predominant opacification of the right  
heart as can be seen in the context of elevated right heart pressures /decreased  
cardiac output. 4. Moderate right and small left pleural effusions. 5. Upper lobe predominant interlobular septal line thickening compatible with  
underlying interstitial edema. Vague area of groundglass opacity within the  
periphery of the left upper lobe obscured by motion artifact, potentially an  
asymmetric manifestation of pulmonary edema versus nonspecific alveolitis. Narrative:  EXAM: CTA Chest  
   
INDICATION: Shortness of breath on exertion, tachycardia, atrial fibrillation. COMPARISON: Prior CT chest dated 9/2/2018 TECHNIQUE: Axial CT imaging from the thoracic inlet through the diaphragm with  
intravenous contrast utilizing CTA study for pulmonary artery evaluation. Coronal and sagittal MIP reformations were generated at a separate workstation. One or more dose reduction techniques were used on this CT: automated exposure  
control, adjustment of the mAs and/or kVp according to patient size, and  
iterative reconstruction techniques. The specific techniques used on this CT  
exam have been documented in the patient's electronic medical record. Digital  
Imaging and Communications in Medicine (DICOM) format image data are available  
to nonaffiliated external healthcare facilities or entities on a secure, media  
free, reciprocally searchable basis with patient authorization for at least a  
12-month period after this study. _______________ FINDINGS:  
   
EXAM QUALITY: Overall exam quality is adequate for the exclusion of pulmonary  
emboli. Respiratory motion artifact is demonstrated was pronounced throughout  
the mid and lower lungs. PULMONARY ARTERIES: As above, despite the presence of respiratory motion  
artifact, there is no convincing evidence of pulmonary embolism. Main pulmonary  
arterial size is estimated at approximately 3.2 cm. MEDIASTINUM: Included thyroid gland is unremarkable. There is redemonstration of  
aneurysmal dilatation of the thoracic aorta, estimated maximally at  
approximately 5.2 cm in size. Allowing for differences incurred by motion  
artifact, this does not appear to be significantly changed from prior. The  
contrast bolus timing results in essentially unenhanced appearance of the  
thoracic aorta. Cardiomegaly with four-chamber cardiac enlargement similar to  
prior. Reflux of contrast into the infrahepatic IVC noted. No pericardial  
effusion. Multivessel coronary arterial atherosclerotic calcification. LYMPH NODES: No enlarged mediastinal or hilar nodes by size criteria. AIRWAY: Central airways appear patent. LUNGS: Bibasilar atelectasis, right greater than left is noted. Mild upper lobe  
predominant interlobular septal line thickening is present. Faint alveolar  
groundglass density within the periphery of the left upper lobe is noted. PLEURA: No pneumothorax. Moderate right and small left pleural effusions are  
noted. UPPER ABDOMEN: Hiatal hernia. Small amount of perihepatic and perisplenic  
ascites. [place] OTHER: No acute or aggressive osseous abnormalities identified. Unchanged T12  
vertebral body hemangioma.  
   
_______________ CXR Results  (Last 48 hours) 04/05/19 0802  XR CHEST PORT Final result Impression:  IMPRESSION:  
   
   
1. Mild cardiac enlargement and small bilateral pleural effusions with associated basilar atelectasis. Narrative:  EXAM: XR CHEST PORT  
   
CLINICAL INDICATION/HISTORY: sob  
-Additional: None COMPARISON: Several prior exams, most recently radiographs 2/9/2018 TECHNIQUE: Frontal view of the chest  
   
_______________ FINDINGS:  
   
HEART AND MEDIASTINUM: Cardiac size is enlarged, similar to prior with stable  
mediastinal contours. LUNGS AND PLEURAL SPACES: Mild bilateral pleural effusions are noted. No  
pneumothorax. No discrete pneumonic consolidation. BONY THORAX AND SOFT TISSUES: No acute osseous abnormality  
   
_______________ Duplex venous B/L LE: Interpretation Summary · No evidence of deep vein thrombosis in the right lower extremity. · No evidence of deep vein thrombosis in the left lower extremity veins assessed. · Left posterior tibial and peroneal vein(s) were not well visualized. Medications given in the ED- Medications  
sodium chloride (NS) flush 5-10 mL (has no administration in time range) dilTIAZem (CARDIZEM) 125 mg in 0.9% sodium chloride 125 mL infusion (10 mg/hr IntraVENous New Bag 4/5/19 1103) enoxaparin (LOVENOX) injection 60 mg (has no administration in time range) dilTIAZem (CARDIZEM) injection 10 mg (10 mg IntraVENous Given 4/5/19 0806)  
acetaminophen (TYLENOL) tablet 650 mg (650 mg Oral Given 4/5/19 0805)  
sodium chloride 0.9 % bolus infusion 1,000 mL (0 mL IntraVENous IV Completed 4/5/19 0943) piperacillin-tazobactam (ZOSYN) 3.375 g in 0.9% sodium chloride (MBP/ADV) 100 mL MBP (0 g IntraVENous IV Completed 4/5/19 0943)  
traMADol (ULTRAM) tablet 50 mg (50 mg Oral Given 4/5/19 0857) iopamidol (ISOVUE-370) 76 % injection 100 mL (100 mL IntraVENous Given 4/5/19 1013) Medical Decision Making I am the first provider for this patient. I reviewed the vital signs, available nursing notes, past medical history, past surgical history, family history and social history. Vital Signs-Reviewed the patient's vital signs. Pulse Oximetry Analysis -96 % on room air Cardiac Monitor: 
Rate: 142 bpm 
Rhythm:  Afib on monitor EKG interpretation: (Preliminary) 8:30 AM  
Atrial fibrillation, rate 134, normal QRS duration, prolonged QTC, no ST elevation, no ST depression, is some artifact EKG read by Dr. Ceci Valenzuela at 7:06 AM 
 
Records Reviewed: Nursing Notes and Old Medical Records Provider Notes (Medical Decision Making): DDX: Arrhythmia, infectious, metabolic, thrombosis, anxiety, CHF, other cardiac, RA Labs, EKG, chest x-ray, dose of Cardizem Procedures: 
Procedures ED Course: WBC within normal limits Lactic acid initially was 2.83 Patient did get some IV fluid, 1 L bolus Blood pressure stable systolic blood pressure 953P Patient did get IV antibiotic and sepsis bundle had been started No apparent UTI, no pneumonia on her chest x-ray. Awaiting CTA chest to rule out PE. Reassessed patient, her joint aches and leg pain have improved after tramadol. Despite IV bolus Cardizem, heart rate remains in the 120s. Start Cardizem drip Initial assessment performed. The patients presenting problems have been discussed, and they are in agreement with the care plan formulated and outlined with them. I have encouraged them to ask questions as they arise throughout their visit. Consult:  Discussed care with Dr. Brad Zhu, Specialty: Cardiologist standard discussion; including history of patients chief complaint, available diagnostic results, and treatment course. He accepts consult, he wants patient to have Lovenox 1 mg/kg twice a day 12:37 PM, 4/5/2019 Consult:  Discussed care with , Specialty: Dr. Lissett Bhandari standard discussion; including history of patients chief complaint, available diagnostic results, and treatment course. Accepts admission to telemetry 1:01 PM, 4/5/2019 Critical care time: I have spent 67 minutes of critical care time involved in lab review, consultations with specialist, family decision making, and documentation. During this entire length of time I was immediately available to the patient. Critical care: The reason for providing this level of medical care for this critically ill patient was due to a critical illness that impaired one or more vital organ systems such that there was a high probability of imminent or life threatening deterioration in the patients condition. This care involved high complexity decision making to assess, manipulate and support vital system functions, to treat this degree vital organ system failure and to prevent further life threatening deterioration of the patient's condition. I discussed diagnosis, results and plan for admission, patient agrees. Diagnosis and Disposition CLINICAL IMPRESSION: 
 
1. Atrial fibrillation with RVR ([address] 2. Dyspnea, unspecified type 3. Arthralgia of both knees 4. Bilateral pleural effusion PLAN: Admission Dragon medical dictation software was used for portions of this report. Unintended transcription errors may occur. My signature above authenticates this document and my orders, the final   
diagnosis (es), discharge prescription (s), and instructions in the Epic   
record. If you have any questions please contact [phone].

## 2019-04-05 NOTE — ED TRIAGE NOTES
Pt brought in by EMS and  states \" I became short of breath and my breathing was compromised so I came in to be evaluated. \"

## 2019-04-05 NOTE — ROUTINE PROCESS
[facility] Medic Code Sepsis - Time of Code [id number] 
- Team: 
Physician Moe Bedside Nurse [place] Elidia Medic RENETTA Jay Cure - SIRS # 1 [id number]  SIRS # 2 [id number] 
- Possible source of infection: Pneumonia - Labs:  (pull in Chem 7 and CBC) - Initial Vitals: T-97.6, HR-98, RR-19, B/P-136/65 -   Blood Cultures collected times 2 OR collected within last 24 hours:  Yes 
- Lactic Acid Collection time OR within last 6 hours: Yes 
- Antibiotic Start time:0905 
- Lactic Acid Result:2.83 
- Target Fluid Bolus Amount:1824 - Time of Fluid Bolus initiated:0807 
- Reason for no target fluid bolus: - New PIV / or line: PIV x2 -  Time of Fluid Bolus Completion:0943 (1000ml given) - Time of Repeat Lactic Acid:1035 - Repeat Lactic Acid Result:1.79 
- Vasopressors Needed? No 
 
- Additional Notes: - Transfer to higher level of care? Pt admitted.

## 2019-04-05 NOTE — PROGRESS NOTES
1407 Pt arrived on unit via stretcher, accompanied by daughter and RN. No signs of distress, no skin issues, telemetry strip verified. [id number] [facility] [address] Telephone orders received from Dr Ivelisse Collazo to decrease Cardizem gtt from 10 mg/hr to 5 mg/hr, heart rate sustaining in the 70's. If heart rate sustain less than 70 discontinue Cardizem gtt or if SBP less than 90. 
 
1808 Pt had a uneventful shift.

## 2019-04-05 NOTE — CONSULTS
[facility] Consult Note      Patient: Jonnie Bundy MRN: [id number]  SSN: [id number]    YOB: 1935  Age: 82 y.o. Sex: female    Date of Consultation: 04/05/2019  Referring Physician: Dr. Andreia Mcwilliams  Reason for Consultation: atrial fibrillation with RVR and diastolic heart failure. HPI:  Pradip Alvares is a 82year-old pleasant patient admitted with atrial fibrillation with RVR and acute on chronic diastolic heart failure. Jonnie Bundy is a 82years old female with past medical history significant for ascending aortic aneurysm, atrial fibrillation, chronic diastolic heart failure, rheumatoid arthritis and have recurrent recent admission for atrial fibrillation and diastolic heart failure in [facility]. Patient is following with Dr. Pao Wade in [facility]. Patient came to [facility] with worsening of shortness of breath and lower extremity edema, patient was found  To be in A. Fib with RVR and acute on chronic diastolic heart failure. Patient was started on IV  Cardizem and IV Lasix and now feeling better. Patient is on Eliquis 2.5 mg twice daily. Patient's CT scan is negative for pulmonary embolism.                             Past Medical History:   Diagnosis Date    Aortic aneurysm (HCC)     At risk for falls     Fall     Hypertension     Nosebleed     RA (rheumatoid arthritis) (HCC)      Past Surgical History:   Procedure Laterality Date    HX GYN      tubal ligation    HX ORTHOPAEDIC      total knee replacement    HX TONSILLECTOMY       Current Facility-Administered Medications   Medication Dose Route Frequency    sodium chloride (NS) flush 5-10 mL  5-10 mL IntraVENous PRN    dilTIAZem (CARDIZEM) 125 mg in 0.9% sodium chloride 125 mL infusion  10 mg/hr IntraVENous CONTINUOUS    [START ON 4/6/2019] furosemide (LASIX) injection 40 mg  40 mg IntraVENous DAILY    acetaminophen (TYLENOL) tablet 650 mg  650 mg Oral Q4H PRN    ondansetron (ZOFRAN) injection 4 mg  4 mg IntraVENous Q4H PRN    [START ON 4/6/2019] apixaban (ELIQUIS) tablet 2.5 mg  2.5 mg Oral BID    [START ON 4/6/2019] dilTIAZem CD (CARDIZEM CD) capsule 120 mg  120 mg Oral DAILY    metoprolol tartrate (LOPRESSOR) tablet 100 mg  100 mg Oral BID    [START ON 4/6/2019] spironolactone (ALDACTONE) tablet 12.5 mg  12.5 mg Oral DAILY    perflutren lipid microspheres (DEFINITY) in NS bolus IV  1 mL IntraVENous RAD ONCE       Allergies and Intolerances:   No Known Allergies    Family History:   History reviewed. No pertinent family history. Social History:   She  reports that she has never smoked. She has never used smokeless tobacco.  She  reports that she drinks alcohol. Review of Systems:     Review of Systems  Gen: No fever, chills, malaise, weight loss/gain. Heent: No headache, rhinorrhea, epistaxis, ear pain, hearing loss, sinus pain, neck pain/stiffness, sore throat. Heart: No chest pain, palpitations, +ROMERO, pnd, or orthopnea. ++ ankle swelling  Resp: No cough, hemoptysis, wheezing and shortness of breath. GI: No nausea, vomiting, diarrhea, constipation, melena or hematochezia. : No urinary obstruction, dysuria or hematuria. Derm: No rash, new skin lesion or pruritis. Musc/skeletal: no bone or joint complains. Vasc: No edema, cyanosis or claudication. Endo: No heat/cold intolerance, no polyuria,polydipsia or polyphagia. Neuro: No unilateral weakness, numbness, tingling. No seizures. Heme: No easy bruising or bleeding.         Physical:   Patient Vitals for the past 6 hrs:   Temp Pulse Resp BP SpO2   04/05/19 1428 97.5 °F (36.4 °C) 81 16 142/66 95 %   04/05/19 1315 98.4 °F (36.9 °C) 90 22 120/79 96 %   04/05/19 1300  74 15 103/46 96 %   04/05/19 1245  83 20 150/56 96 %   04/05/19 1230  98 17 109/69 94 %   04/05/19 1200  93 15 137/67 97 %   04/05/19 1130  94 17 126/59 97 %   04/05/19 1100  (!) 102 24 137/54 97 %   04/05/19 1045  (!) 110 25 121/77 100 %         Exam:   General Appearance: Comfortable, not using accessory muscles of respiration. HEENT: GLADIS. HEAD: Atraumatic  NECK: No JVD, no thyroidomeglay. CAROTIDS:  LUNGS: Clear bilaterally. HEART: S1 variable +S2     ABD: Non-tender, BS Audible    EXT: ++ edema, and no cysnosis. VASCULAR EXAM: Pulses are intact. PSYCHIATRIC EXAM: Mood is appropriate. MUSCULOSKELETAL: Grossly no joint deformity. NEUROLOGICAL: Motor and sensory sytem intact and Cranial nerves II-XII intact.     Review of Data:   LABS:   Lab Results   Component Value Date/Time    WBC 5.8 04/05/2019 07:00 AM    HGB 11.9 (L) 04/05/2019 07:00 AM    HCT 36.0 04/05/2019 07:00 AM    PLATELET 305 98/19/2650 07:00 AM     Lab Results   Component Value Date/Time    Sodium 124 (L) 04/05/2019 07:00 AM    Potassium 5.2 04/05/2019 07:00 AM    Chloride 91 (L) 04/05/2019 07:00 AM    CO2 24 04/05/2019 07:00 AM    Glucose 150 (H) 04/05/2019 07:00 AM    BUN 15 04/05/2019 07:00 AM    Creatinine 0.98 04/05/2019 07:00 AM     No results found for: CHOL, CHOLX, CHLST, CHOLV, HDL, LDL, LDLC, DLDLP, TGLX, TRIGL, TRIGP  No results found for: GPT  No results found for: HBA1C, HGBE8, RJD1YWCQ, KRB7MIJE      Cardiology Procedures:   Results for orders placed or performed during the hospital encounter of 04/05/19   EKG, 12 LEAD, INITIAL   Result Value Ref Range    Ventricular Rate 134 BPM    Atrial Rate 337 BPM    QRS Duration 88 ms    Q-T Interval 350 ms    QTC Calculation (Bezet) 522 ms    Calculated R Axis -14 degrees    Calculated T Axis 114 degrees    Diagnosis       Atrial flutter with variable AV block  Low voltage QRS  Abnormal QRS-T angle, consider primary T wave abnormality  Abnormal ECG  When compared with ECG of 02-SEP-2018 11:39,  Atrial flutter has replaced Sinus rhythm  Nonspecific T wave abnormality has replaced inverted T waves in Lateral leads             Impression / Plan:    Patient Active Problem List   Diagnosis Code    Dyspnea R06.00    Arthritic-like pain M25.50  Atrial fibrillation with RVR ([facility]) I48.91    Acute diastolic CHF (congestive heart failure) ([facility]) I50.31    Hyponatremia E87.1    Rheumatoid arthritis ([facility]) M06.9       Assessment and plan  #1 atrial fibrillation with RVR  #2 acute on chronic diastolic heart failure  #3 hypertension  #4 ascending aortic aneurysm  #5Rheumatoid arthritis    Plan  Continue IV Cardizem infusion titrate to wean off if heart rate is less than 80. Increase Cardizem  mg to 240 mg once daily  Lasix 40 mg once daily  Counseled the patient about fluid restriction to 1500 mL per day. Patient is is drinking excessive tea, soda and alcohol  Discussed in detail with the patient and daughters.         Signed By: Maco Smith MD     April 5, 2019

## 2019-04-05 NOTE — ROUTINE PROCESS
Bedside and Verbal shift change report given to Rhys Romero (oncoming nurse) by Theodore Gauthier (offgoing nurse). Report included the following information SBAR, Kardex, Intake/Output, MAR, Recent Results and Cardiac Rhythm Afib.

## 2019-04-06 LAB
ANION GAP SERPL CALC-SCNC: 8 MMOL/L (ref 3–18)
ATRIAL RATE: 337 BPM
BASOPHILS # BLD: 0 K/UL (ref 0–0.1)
BASOPHILS NFR BLD: 1 % (ref 0–2)
BUN SERPL-MCNC: 14 MG/DL (ref 7–18)
BUN/CREAT SERPL: 14 (ref 12–20)
CALCIUM SERPL-MCNC: 8.4 MG/DL (ref 8.5–10.1)
CALCULATED R AXIS, ECG10: -14 DEGREES
CALCULATED T AXIS, ECG11: 114 DEGREES
CHLORIDE SERPL-SCNC: 96 MMOL/L (ref 100–108)
CO2 SERPL-SCNC: 24 MMOL/L (ref 21–32)
CREAT SERPL-MCNC: 1 MG/DL (ref 0.6–1.3)
DIAGNOSIS, 93000: NORMAL
DIFFERENTIAL METHOD BLD: ABNORMAL
EOSINOPHIL # BLD: 0.1 K/UL (ref 0–0.4)
EOSINOPHIL NFR BLD: 1 % (ref 0–5)
ERYTHROCYTE [DISTWIDTH] IN BLOOD BY AUTOMATED COUNT: 14.2 % (ref 11.6–14.5)
GLUCOSE SERPL-MCNC: 93 MG/DL (ref 74–99)
HCT VFR BLD AUTO: 35.4 % (ref 35–45)
HGB BLD-MCNC: 11.5 G/DL (ref 12–16)
LYMPHOCYTES # BLD: 2.1 K/UL (ref 0.9–3.6)
LYMPHOCYTES NFR BLD: 32 % (ref 21–52)
MCH RBC QN AUTO: 30.7 PG (ref 24–34)
MCHC RBC AUTO-ENTMCNC: 32.5 G/DL (ref 31–37)
MCV RBC AUTO: 94.4 FL (ref 74–97)
MONOCYTES # BLD: 0.8 K/UL (ref 0.05–1.2)
MONOCYTES NFR BLD: 13 % (ref 3–10)
NEUTS SEG # BLD: 3.5 K/UL (ref 1.8–8)
NEUTS SEG NFR BLD: 53 % (ref 40–73)
PLATELET # BLD AUTO: 256 K/UL (ref 135–420)
PMV BLD AUTO: 9.7 FL (ref 9.2–11.8)
POTASSIUM SERPL-SCNC: 4.9 MMOL/L (ref 3.5–5.5)
Q-T INTERVAL, ECG07: 350 MS
QRS DURATION, ECG06: 88 MS
QTC CALCULATION (BEZET), ECG08: 522 MS
RBC # BLD AUTO: 3.75 M/UL (ref 4.2–5.3)
SODIUM SERPL-SCNC: 128 MMOL/L (ref 136–145)
TSH SERPL DL<=0.05 MIU/L-ACNC: 3.7 UIU/ML (ref 0.36–3.74)
VENTRICULAR RATE, ECG03: 134 BPM
VIT B12 SERPL-MCNC: 664 PG/ML (ref 211–911)
WBC # BLD AUTO: 6.5 K/UL (ref 4.6–13.2)

## 2019-04-06 PROCEDURE — 82607 VITAMIN B-12: CPT

## 2019-04-06 PROCEDURE — 65660000000 HC RM CCU STEPDOWN

## 2019-04-06 PROCEDURE — 36415 COLL VENOUS BLD VENIPUNCTURE: CPT

## 2019-04-06 PROCEDURE — 74011250636 HC RX REV CODE- 250/636: Performed by: INTERNAL MEDICINE

## 2019-04-06 PROCEDURE — 80048 BASIC METABOLIC PNL TOTAL CA: CPT

## 2019-04-06 PROCEDURE — 74011250637 HC RX REV CODE- 250/637: Performed by: INTERNAL MEDICINE

## 2019-04-06 PROCEDURE — 77030012890

## 2019-04-06 PROCEDURE — 97161 PT EVAL LOW COMPLEX 20 MIN: CPT

## 2019-04-06 PROCEDURE — 85025 COMPLETE CBC W/AUTO DIFF WBC: CPT

## 2019-04-06 PROCEDURE — 84443 ASSAY THYROID STIM HORMONE: CPT

## 2019-04-06 RX ADMIN — APIXABAN 2.5 MG: 2.5 TABLET, FILM COATED ORAL at 20:42

## 2019-04-06 RX ADMIN — METOPROLOL TARTRATE 100 MG: 50 TABLET ORAL at 20:42

## 2019-04-06 RX ADMIN — ACETAMINOPHEN 650 MG: 325 TABLET ORAL at 22:01

## 2019-04-06 RX ADMIN — SPIRONOLACTONE 12.5 MG: 25 TABLET, FILM COATED ORAL at 08:12

## 2019-04-06 RX ADMIN — DILTIAZEM HYDROCHLORIDE 240 MG: 240 CAPSULE, COATED, EXTENDED RELEASE ORAL at 08:11

## 2019-04-06 RX ADMIN — METOPROLOL TARTRATE 100 MG: 50 TABLET ORAL at 08:12

## 2019-04-06 RX ADMIN — APIXABAN 2.5 MG: 2.5 TABLET, FILM COATED ORAL at 08:11

## 2019-04-06 RX ADMIN — FUROSEMIDE 40 MG: 10 INJECTION, SOLUTION INTRAMUSCULAR; INTRAVENOUS at 08:14

## 2019-04-06 NOTE — PROGRESS NOTES
Chart reviewed as CM on call. Pt admitted by hospitalist to tele for CHF, A fib, dyspnea. PT is recommending HH. Will consider HH with tele health for hx CHF. CM will follow for transition of care needs. Reason for Admission:   Per H&P, pt \"is a 82 y.o. female with past medical history significant for atrial fibrillation, chronic diastolic CHF, hyponatremia, rheumatoid arthritis presents to the ER for 2 days of worsening shortness of breath. She states she has orthopnea and PND. She denies chest pain, pressure, palpitations. She noted + Le edema. She has had admissions to [facility] for the same. She voices compliance w her medication regimen, her daughters set up her medication pill box and havent double checked. 
  
On presentation to the ER she was in afib w RVR, noted rales on examination and LE edema. CXR w pulmonary edema, CTA neg for PE and + for pulmonary edema, EKG w Afib RVR. Medicine is asked to admit for further management. \" 
 
RRAT Score:  18 mod Do you (patient/family) have any concerns for transition/discharge? Plan for utilizing home health:   Yes, recommended by PT Current Advanced Directive/Advance Care Plan:  Not on file. Provider please consider palliative care/spiritual care consult to address ACP Likelihood of readmission? Mod 
         
Transition of Care Plan:      TBD Care Management Interventions Transition of Care Consult (CM Consult): Discharge Planning

## 2019-04-06 NOTE — PROGRESS NOTES
Shift Summary:  Patient spent uneventful shift. No issues noted. See flow sheet and MAR. Patient HR sustained in 70's for several hours and Cardizem drip stopped at 0406.

## 2019-04-06 NOTE — PROGRESS NOTES
Hospitalist Progress Note Patient: Rick Chandler MRN: [id number]  CSN: [id number] YOB: 1935  Age: 82 y.o. Sex: female DOA: 4/5/2019 LOS:  LOS: 1 day Assessment/Plan 1. Acute decompensated diastolic CHF 2. Atrial fibrillation w rapid ventricular rate, now controlled 3. Aortic regurgitation, mitral regurgitation, tricuspid regurgitation 4. Hyponatremia 5. Rheumatoid arthritis 6. Thoracic aortic aneurysm 7. Dementia 8. B12 deficiency 
  
 
Plan: 
- continue diuresis w IV lasix 
- transition off cardizem gtt 
- compression stockings 
- fluid restriction 
- continue eliquis 
- mobilize w PT 
- appreciate cardiology input - DNR Patient Active Problem List  
Diagnosis Code  Dyspnea R06.00  
 Arthritic-like pain M25.50  Atrial fibrillation with RVR ([facility]) I48.91  
 Acute diastolic CHF (congestive heart failure) ([facility]) I50.31  
 Hyponatremia E87.1  Rheumatoid arthritis ([address] M06.9 Subjective:  
 cc: \" I think I can go home\" No acute events overnight Reports dyspnea is improved No chest pain HR improved, off cardizem gtt currently Edema appears worse this AM 
 
 
REVIEW OF SYSTEMS: 
General: No fevers or chills. Cardiovascular: No chest pain or pressure. No palpitations. Pulmonary: No shortness of breath. Gastrointestinal: No nausea, vomiting. Objective:  
  
 
Vital signs/Intake and Output: 
Visit Vitals /68 (BP 1 Location: Left arm, BP Patient Position: At rest) Pulse 72 Temp 97.9 °F (36.6 °C) Resp 18 Ht 5' 4\" (1.626 m) Wt 60.1 kg (132 lb 9.6 oz) SpO2 93% BMI 22.76 kg/m² Current Shift:  No intake/output data recorded. Last three shifts:  04/04 1901 - 04/06 0700 In: 1384.9 [P.O.:240; I.V.:1144.9] Out: 600 [Urine:600] Body mass index is 22.76 kg/m². Physical Exam: 
GEN: Alert and oriented times three NAD 
EYES: conjunctiva normal, lids with out lesions HEENT: MMM, No thyromegaly, no lymphadenopathy HEART:  +S1 +S2, no JVD, pulses 2+ distally LUNGS: CTA B/L no wheezes, rales or rhonchi ABDOMEN: + BS, soft NT/ND no organomegaly,  No rebound EXTREMITIES: + edema - cyanosis, cap refill normal 
 SKIN: no rashes or skin breakdown, no nodules, normal turgor Current Facility-Administered Medications Medication Dose Route Frequency  sodium chloride (NS) flush 5-10 mL  5-10 mL IntraVENous PRN  
 dilTIAZem (CARDIZEM) 125 mg in 0.9% sodium chloride 125 mL infusion  5 mg/hr IntraVENous CONTINUOUS  
 furosemide (LASIX) injection 40 mg  40 mg IntraVENous DAILY  acetaminophen (TYLENOL) tablet 650 mg  650 mg Oral Q4H PRN  
 ondansetron (ZOFRAN) injection 4 mg  4 mg IntraVENous Q4H PRN  
 apixaban (ELIQUIS) tablet 2.5 mg  2.5 mg Oral BID  metoprolol tartrate (LOPRESSOR) tablet 100 mg  100 mg Oral BID  spironolactone (ALDACTONE) tablet 12.5 mg  12.5 mg Oral DAILY  dilTIAZem CD (CARDIZEM CD) capsule 240 mg  240 mg Oral DAILY All the patient's labs over the past 24 hours were reviewed both during my initial daily workflow process and at the time notated as \"note time\" in [facility]. (It is not time stamped separately in this workflow.)  Select labs are listed below. Labs: Results:  
   
Chemistry Recent Labs 04/06/19 
0500 04/05/19 
0700 GLU 93 150* * 124* K 4.9 5.2 CL 96* 91* CO2 24 24 BUN 14 15 CREA 1.00 0.98  
CA 8.4* 9.4 AGAP 8 9 BUCR 14 15 AP  --  84  
TP  --  7.0 ALB  --  3.8 GLOB  --  3.2 AGRAT  --  1.2  
  
CBC w/Diff Recent Labs 04/06/19 
0500 04/05/19 
0700 WBC 6.5 5.8  
RBC 3.75* 3.81* HGB 11.5* 11.9*  
HCT 35.4 36.0  265 GRANS 53 77* LYMPH 32 15* EOS 1 0 Cardiac Enzymes Recent Labs 04/05/19 
0700 CPK 79 CKND1 4.7* Coagulation Recent Labs 04/05/19 
0700 PTP 16.3* INR 1.3* APTT 31.5 Liver Enzymes Recent Labs 04/05/19 
0700 TP 7.0 ALB 3.8 AP 84 SGOT 38* Procedures/imaging: see electronic medical records for all procedures/Xrays and details which were not copied into this note but were reviewed prior to creation of Plan Imaging personally reviewed: 
· 2d echo: \" Left ventricular low normal global systolic function. Estimated left ventricular ejection fraction is 56 - 60%. Left ventricular moderate concentric hypertrophy. E/E' ratio is 22.31. · Left atrial cavity size is severely dilated. · Right ventricular global systolic function is mildly reduced. · Right atrial cavity size is severely dilated. · Aortic valve leaflet calcification present. Mild to moderate aortic valve regurgitation is present. · Mitral valve non-specific thickening. Moderate mitral annular calcification. Moderate mitral valve regurgitation. · Moderate tricuspid valve regurgitation is present. Moderate pulmonary hypertension is present. · Mild pulmonic valve regurgitation is present. · Severely elevated central venous pressure (15+ mmHg); IVC diameter is larger than 21 mm and collapses less than 50% with respiration. · There is a right pleural effusion. \" Manjula Garcia,  Internal Medicine/Geriatrics

## 2019-04-06 NOTE — PROGRESS NOTES
0226:  Assumed care for patient, received bedside report from [facility], RN. Patient sitting in chair eating breakfast with no complaints of pain or discomfort at the time. Max love applied. Whiteboard updated, bed at the lowest position with call bell within reach.

## 2019-04-06 NOTE — ROUTINE PROCESS
Bedside and Verbal shift change report given to Maryln Apgar, RN (oncoming nurse) by NASRA Rodriguez RN (offgoing nurse). Report included the following information SBAR, Kardex, Intake/Output, MAR and Recent Results.

## 2019-04-06 NOTE — PROGRESS NOTES
Cardiology Progress Note Patient: Evelina Larson        Sex: female          DOA: 4/5/2019 YOB: 1935      Age:  82 y.o.        LOS:  LOS: 1 day Assessment/Plan Active Problems: Dyspnea (4/5/2019) Arthritic-like pain (4/5/2019) Atrial fibrillation with RVR ([facility] [address] (4/5/2019) Acute diastolic CHF (congestive heart failure) ([facility] [address] (4/5/2019) Hyponatremia (4/5/2019) Rheumatoid arthritis ([facility] [address] (4/5/2019) Plan: 
Atrial fibrillation rate control Edema and SOB is better. Patient wants to go home May be discharged in AM if stable Subjective:  
 cc: Afib with RVR SOB REVIEW OF SYSTEMS:  
 
General: No fevers or chills. Cardiovascular: No chest pain or pressure. No palpitations. mild ankle swelling Pulmonary: + SOB, orthopnea, PND Gastrointestinal: No nausea, vomiting or diarrhea Objective:  
  
Visit Vitals /61 (BP 1 Location: Left arm, BP Patient Position: Sitting) Pulse 81 Temp 97.7 °F (36.5 °C) Resp 14 Ht 5' 4\" (1.626 m) Wt 60.1 kg (132 lb 9.6 oz) SpO2 98% BMI 22.76 kg/m² Body mass index is 22.76 kg/m². Physical Exam: 
General Appearance: Comfortable, not using accessory muscles of respiration. NECK: No JVD, no thyroidomeglay. LUNGS: Clear bilaterally. HEART: S1 variable +S2 audible, ABD: Non-tender, BS Audible EXT: mild edema, and no cysnosis. VASCULAR EXAM: Pulses are intact. PSYCHIATRIC EXAM: Mood is appropriate. Medication: 
Current Facility-Administered Medications Medication Dose Route Frequency  sodium chloride (NS) flush 5-10 mL  5-10 mL IntraVENous PRN  
 dilTIAZem (CARDIZEM) 125 mg in 0.9% sodium chloride 125 mL infusion  5 mg/hr IntraVENous CONTINUOUS  
 furosemide (LASIX) injection 40 mg  40 mg IntraVENous DAILY  acetaminophen (TYLENOL) tablet 650 mg  650 mg Oral Q4H PRN  
 ondansetron (ZOFRAN) injection 4 mg  4 mg IntraVENous Q4H PRN  
  apixaban (ELIQUIS) tablet 2.5 mg  2.5 mg Oral BID  metoprolol tartrate (LOPRESSOR) tablet 100 mg  100 mg Oral BID  spironolactone (ALDACTONE) tablet 12.5 mg  12.5 mg Oral DAILY  dilTIAZem CD (CARDIZEM CD) capsule 240 mg  240 mg Oral DAILY Lab/Data Reviewed: 
Procedures/imaging: see electronic medical records for all procedures/Xrays  
and details which were not copied into this note but were reviewed prior to creation of Plan 
  
 
All lab results for the last 24 hours reviewed. Recent Labs 04/06/19 
0500 04/05/19 
0700 WBC 6.5 5.8 HGB 11.5* 11.9*  
HCT 35.4 36.0  265 Recent Labs 04/06/19 
0500 04/05/19 
0700 * 124* K 4.9 5.2 CL 96* 91* CO2 24 24 GLU 93 150* BUN 14 15 CREA 1.00 0.98  
CA 8.4* 9.4 Signed By: Moises Beck MD   
 April 6, 2019

## 2019-04-06 NOTE — PROGRESS NOTES
Problem: Afib Pathway: Day 1 Goal: Activity/Safety Outcome: Progressing Towards Goal 
  
Problem: Patient Education: Go to Patient Education Activity Goal: Patient/Family Education Outcome: Progressing Towards Goal 
  
Problem: Afib Pathway: Day 1 Goal: Discharge Planning Outcome: Progressing Towards Goal 
  
Problem: Afib Pathway: Day 1 Goal: Medications Outcome: Progressing Towards Goal 
  
Problem: Falls - Risk of 
Goal: *Absence of Falls Description Document [place] Fall Risk and appropriate interventions in the flowsheet. Outcome: Progressing Towards Goal 
  
Problem: Patient Education: Go to Patient Education Activity Goal: Patient/Family Education Outcome: Progressing Towards Goal

## 2019-04-06 NOTE — PROGRESS NOTES
Problem: Mobility Impaired (Adult and Pediatric) Goal: *Acute Goals and Plan of Care (Insert Text) Description Physical Therapy Goals Initiated 4/6/2019 and to be accomplished within 3-5 day(s) 1. Patient will move from supine <> sit with S in prep for out of bed activity and change of position. 2.  Patient will perform sit<> stand with S with LRAD in prep for transfers/ambulation. 3.  Patient will transfer from bed <> chair with S with LRAD for time up in chair for completion of ADL activity. 4.  Patient will ambulate 150 feet with LRAD/S for improved functional mobility/safe discharge. 5.  Patient will ascend/descend 3-5 stairs with handrail(s) with minimal assistance/contact guard assist for home re-entry as needed. Outcome: Progressing Towards Goal 
 PHYSICAL THERAPY EVALUATION Patient: Alma Hill (82 y.o. female) Date: 4/6/2019 Primary Diagnosis: Atrial fibrillation with RVR ([address] [I48.91] Dyspnea [R06.00] Arthritic-like pain [M25.50] Precautions: Fall ASSESSMENT : 
Based on the objective data described below, the patient presents with decrease independence w/ bed mobility, transfers, gait, and step negotiation. Pt seen sitting in upright chair prior to session w/ telemonitor donned. Pt reports PTA that she lives alone and is I with mobility task and ADLs and is currently still driving. Pt reports that she only uses a SPC in the community for comfort. Pt able to ambulate 300 ft w/ GB but required two standing rest breaks secondary to reporting decrease activity tolerance. Pt demonstrates an unsteady gait, decrease sarah, decrease stride length, and decrease step clearance. Will assess the use of an AD RW vs Cane next session. Pt left sitting in upright chair after session w/ all needs met, family present in the room, call bell and tray in reach, nurse notified after session. Patient will benefit from skilled intervention to address the above impairments. Patient?s rehabilitation potential is considered to be Good Factors which may influence rehabilitation potential include:  
? None noted ? Mental ability/status ? Medical condition ? Home/family situation and support systems ? Safety awareness 
? Pain tolerance/management 
? Other: PLAN : 
Recommendations and Planned Interventions: 
?           Bed Mobility Training             ? Neuromuscular Re-Education ? Transfer Training                   ? Orthotic/Prosthetic Training 
? Gait Training                          ? Modalities ? Therapeutic Exercises          ? Edema Management/Control ? Therapeutic Activities            ? Patient and Family Training/Education ? Other (comment): Frequency/Duration: Patient will be followed by physical therapy 1-2 times per day to address goals. Discharge Recommendations: Home Health Further Equipment Recommendations for Discharge: TBD pending pt's progress SUBJECTIVE:  
Patient stated ? I feel a lot weaker today. ? OBJECTIVE DATA SUMMARY:  
 
Past Medical History:  
Diagnosis Date Aortic aneurysm ([facility] [address] At risk for falls Fall Hypertension Nosebleed RA (rheumatoid arthritis) ([facility] [address] Past Surgical History:  
Procedure Laterality Date HX GYN    
 tubal ligation HX ORTHOPAEDIC    
 total knee replacement HX TONSILLECTOMY Barriers to Learning/Limitations: yes;  physical 
Compensate with: Verbal Cues and Tactile Cues Prior Level of Function/Home Situation:  
Home Situation Home Environment: Private residence # Steps to Enter: 2 Rails to Enter: No((uses cane)) One/Two Story Residence: Two story # of Interior Steps: 15 Interior Rails: Both Living Alone: Yes Support Systems: Family member(s) Patient Expects to be Discharged to[de-identified] Private residence Current DME Used/Available at Home: Cane, straight Critical Behavior: 
Neurologic State: Alert Orientation Level: Oriented X4 Psychosocial 
Patient Behaviors: Calm; Cooperative Purposeful Interaction: Yes Pt Identified Daily Priority: Clinical issues (comment) [employer] Process: Nurture loving kindness;Establish trust;Teaching/learning;Create healing environment; Attend basic human needs Caring Interventions: Reassure; Therapeutic modalities Reassure: Therapeutic listening; Informing; Acceptance;Caring rounds Therapeutic Modalities: Humor; Intentional therapeutic touch Strength:   
Strength: Generally decreased, functional 
Tone & Sensation:  
Tone: Normal 
Sensation: Intact Range Of Motion: 
AROM: Generally decreased, functional 
Functional Mobility: 
Transfers: 
Sit to Stand: Contact guard assistance Stand to Sit: Contact guard assistance Balance:  
Sitting: Intact Standing: Impaired; Without support Standing - Static: Fair Standing - Dynamic : Fair Ambulation/Gait Training: 
Distance (ft): 300 Feet (ft) Assistive Device: Gait belt Ambulation - Level of Assistance: Contact guard assistance Gait Description (WDL): Exceptions to [facility] Gait Abnormalities: Decreased step clearance Base of Support: Widened Speed/Tori: Slow Step Length: Left shortened;Right shortened Swing Pattern: Left asymmetrical;Right asymmetrical 
Pain: 
Pain Scale 1: Numeric (0 - 10) Pain Intensity 1: 0 Activity Tolerance:  
Fair Please refer to the flowsheet for vital signs taken during this treatment. After treatment:  
?         Patient left in no apparent distress sitting up in chair ? Patient left in no apparent distress in bed 
? Call bell left within reach ? Nursing notified ? Caregiver present ? Bed alarm activated COMMUNICATION/EDUCATION:  
?         Fall prevention education was provided and the patient/caregiver indicated understanding. ?          Patient/family have participated as able in goal setting and plan of care. ?         Patient/family agree to work toward stated goals and plan of care. ?         Patient understands intent and goals of therapy, but is neutral about his/her participation. ? Patient is unable to participate in goal setting and plan of care. Thank you for this referral. 
James Gusman, PT Time Calculation: 18 mins Eval Complexity: History: HIGH Complexity :3+ comorbidities / personal factors will impact the outcome/ POC Exam:LOW Complexity : 1-2 Standardized tests and measures addressing body structure, function, activity limitation and / or participation in recreation  Presentation: LOW Complexity : Stable, uncomplicated  Clinical Decision Making:Low Complexity ambulate >30ft  Overall Complexity:LOW

## 2019-04-07 VITALS
SYSTOLIC BLOOD PRESSURE: 126 MMHG | HEART RATE: 76 BPM | BODY MASS INDEX: 22.64 KG/M2 | HEIGHT: 64 IN | OXYGEN SATURATION: 95 % | RESPIRATION RATE: 18 BRPM | TEMPERATURE: 98 F | WEIGHT: 132.6 LBS | DIASTOLIC BLOOD PRESSURE: 70 MMHG

## 2019-04-07 LAB
ANION GAP SERPL CALC-SCNC: 8 MMOL/L (ref 3–18)
BACTERIA SPEC CULT: NORMAL
BASOPHILS # BLD: 0 K/UL (ref 0–0.1)
BASOPHILS NFR BLD: 1 % (ref 0–2)
BUN SERPL-MCNC: 14 MG/DL (ref 7–18)
BUN/CREAT SERPL: 14 (ref 12–20)
CALCIUM SERPL-MCNC: 8.9 MG/DL (ref 8.5–10.1)
CHLORIDE SERPL-SCNC: 95 MMOL/L (ref 100–108)
CO2 SERPL-SCNC: 24 MMOL/L (ref 21–32)
CREAT SERPL-MCNC: 1 MG/DL (ref 0.6–1.3)
DIFFERENTIAL METHOD BLD: ABNORMAL
EOSINOPHIL # BLD: 0 K/UL (ref 0–0.4)
EOSINOPHIL NFR BLD: 1 % (ref 0–5)
ERYTHROCYTE [DISTWIDTH] IN BLOOD BY AUTOMATED COUNT: 14.4 % (ref 11.6–14.5)
GLUCOSE SERPL-MCNC: 127 MG/DL (ref 74–99)
HCT VFR BLD AUTO: 35.2 % (ref 35–45)
HGB BLD-MCNC: 11.3 G/DL (ref 12–16)
LYMPHOCYTES # BLD: 1.2 K/UL (ref 0.9–3.6)
LYMPHOCYTES NFR BLD: 16 % (ref 21–52)
MCH RBC QN AUTO: 30.5 PG (ref 24–34)
MCHC RBC AUTO-ENTMCNC: 32.1 G/DL (ref 31–37)
MCV RBC AUTO: 95.1 FL (ref 74–97)
MONOCYTES # BLD: 0.8 K/UL (ref 0.05–1.2)
MONOCYTES NFR BLD: 11 % (ref 3–10)
NEUTS SEG # BLD: 5.3 K/UL (ref 1.8–8)
NEUTS SEG NFR BLD: 71 % (ref 40–73)
PLATELET # BLD AUTO: 275 K/UL (ref 135–420)
PMV BLD AUTO: 9.9 FL (ref 9.2–11.8)
POTASSIUM SERPL-SCNC: 4.5 MMOL/L (ref 3.5–5.5)
RBC # BLD AUTO: 3.7 M/UL (ref 4.2–5.3)
SERVICE CMNT-IMP: NORMAL
SODIUM SERPL-SCNC: 127 MMOL/L (ref 136–145)
WBC # BLD AUTO: 7.4 K/UL (ref 4.6–13.2)

## 2019-04-07 PROCEDURE — 85025 COMPLETE CBC W/AUTO DIFF WBC: CPT

## 2019-04-07 PROCEDURE — 80048 BASIC METABOLIC PNL TOTAL CA: CPT

## 2019-04-07 PROCEDURE — 74011250637 HC RX REV CODE- 250/637: Performed by: INTERNAL MEDICINE

## 2019-04-07 PROCEDURE — 36415 COLL VENOUS BLD VENIPUNCTURE: CPT

## 2019-04-07 PROCEDURE — 74011250636 HC RX REV CODE- 250/636: Performed by: INTERNAL MEDICINE

## 2019-04-07 RX ORDER — METOPROLOL TARTRATE 100 MG/1
100 TABLET ORAL 2 TIMES DAILY
Qty: 60 TAB | Refills: 0 | Status: SHIPPED | OUTPATIENT
Start: 2019-04-07

## 2019-04-07 RX ORDER — FUROSEMIDE 40 MG/1
40 TABLET ORAL DAILY
Qty: 30 TAB | Refills: 0 | Status: SHIPPED | OUTPATIENT
Start: 2019-04-07

## 2019-04-07 RX ORDER — SPIRONOLACTONE 25 MG/1
12.5 TABLET ORAL DAILY
Qty: 30 TAB | Refills: 0 | Status: SHIPPED | OUTPATIENT
Start: 2019-04-07

## 2019-04-07 RX ORDER — DILTIAZEM HYDROCHLORIDE 240 MG/1
240 CAPSULE, COATED, EXTENDED RELEASE ORAL DAILY
Qty: 30 CAP | Refills: 0 | Status: SHIPPED | OUTPATIENT
Start: 2019-04-07

## 2019-04-07 RX ADMIN — ACETAMINOPHEN 650 MG: 325 TABLET ORAL at 02:25

## 2019-04-07 RX ADMIN — FUROSEMIDE 40 MG: 10 INJECTION, SOLUTION INTRAMUSCULAR; INTRAVENOUS at 08:09

## 2019-04-07 RX ADMIN — SPIRONOLACTONE 12.5 MG: 25 TABLET, FILM COATED ORAL at 08:09

## 2019-04-07 RX ADMIN — METOPROLOL TARTRATE 100 MG: 50 TABLET ORAL at 08:09

## 2019-04-07 RX ADMIN — APIXABAN 2.5 MG: 2.5 TABLET, FILM COATED ORAL at 08:09

## 2019-04-07 RX ADMIN — DILTIAZEM HYDROCHLORIDE 240 MG: 240 CAPSULE, COATED, EXTENDED RELEASE ORAL at 08:08

## 2019-04-07 NOTE — ROUTINE PROCESS
Bedside and Verbal shift change report given to Zahra Torre RN (oncoming nurse) by Jarrod Lima RN (offgoing nurse). Report included the following information SBAR and Kardex.

## 2019-04-07 NOTE — PROGRESS NOTES
0725:Received verbal bedside report from off going nurse ERNESTINA Wynn Patient care received. Patient alert and confused. Patient stating she wants to go upstairs to her room. Patient reoriented and told that she was int the hospital. Patient then proceeded to call family memeber to come sit with her at hospital.Patient stable. Call light with in reach bed in lowest position.

## 2019-04-07 NOTE — DISCHARGE SUMMARY
Discharge Summary  Subjective:       Admit Date: 4/5/2019  Discharge Date:  4/7/2019, 7:59 AM    Discharging Physician: Tabitha Bolden pager [phone]  Primary Care Provider:  Sara Aquino MD    Patient ID:  Josef Gomes  82 y.o. female  1935    Reason for Admission:  Atrial fibrillation with RVR ([facility] [address] [I48.91]  Dyspnea [R06.00]  Arthritic-like pain [M25.50]    Discharge Diagnosis:     1. Acute decompensated diastolic CHF  2. Atrial fibrillation w rapid ventricular rate, now controlled  3. Aortic regurgitation, mitral regurgitation, tricuspid regurgitation  4. Hyponatremia  5. Rheumatoid arthritis  6. Thoracic aortic aneurysm  7. Dementia  8. B12 deficiency             Patient Active Problem List   Diagnosis Code    Dyspnea R06.00    Arthritic-like pain M25.50    Atrial fibrillation with RVR ([facility]) I48.91    Acute diastolic CHF (congestive heart failure) ([facility]) I50.31    Hyponatremia E87.1    Rheumatoid arthritis ([facility] [address] M06.9       Consultants:    cardiology    Hospital Course:   Reason for admission ( Admitting HPI): \" Josef Gomes is a 82 y.o. female with past medical history significant for atrial fibrillation, chronic diastolic CHF, hyponatremia, rheumatoid arthritis presents to the ER for 2 days of worsening shortness of breath. She states she has orthopnea and PND. She denies chest pain, pressure, palpitations. She noted + Le edema. She has had admissions to [facility] for the same. She voices compliance w her medication regimen, her daughters set up her medication pill box and havent double checked.     On presentation to the ER she was in afib w RVR, noted rales on examination and LE edema. CXR w pulmonary edema, CTA neg for PE and + for pulmonary edema, EKG w Afib RVR. Medicine is asked to admit for further management. \"    She was admitted to the hospitalist service, started on cardizem gtt and diuresed w IV lasix.  Her HR improved and she clinically improved as well.her cardizem was increased, aldactone decrased. She was encouraged to continue a fluid restrition but  Had a difficult time doing so. She also had significant sundowning and confusion. She is stable for DC from inpatient services w home healthbut I recommend she have 24 hour supervision for her safety given her dementia. She is at high risk for readmission. Pertinent Imaging/ Operative procedures:   · 2d echo:\" Left ventricular low normal global systolic function. Estimated left ventricular ejection fraction is 56 - 60%. Left ventricular moderate concentric hypertrophy. E/E' ratio is 22.31. · Left atrial cavity size is severely dilated. · Right ventricular global systolic function is mildly reduced. · Right atrial cavity size is severely dilated. · Aortic valve leaflet calcification present. Mild to moderate aortic valve regurgitation is present. · Mitral valve non-specific thickening. Moderate mitral annular calcification. Moderate mitral valve regurgitation. · Moderate tricuspid valve regurgitation is present. Moderate pulmonary hypertension is present. · Mild pulmonic valve regurgitation is present. · Severely elevated central venous pressure (15+ mmHg); IVC diameter is larger than 21 mm and collapses less than 50% with respiration.   There is a right pleural effusion\"    PVL\" no dvt\"    Pertinent Results:    BMP:   Lab Results   Component Value Date/Time     (L) 04/07/2019 02:00 AM    K 4.5 04/07/2019 02:00 AM    CL 95 (L) 04/07/2019 02:00 AM    CO2 24 04/07/2019 02:00 AM    AGAP 8 04/07/2019 02:00 AM     (H) 04/07/2019 02:00 AM    BUN 14 04/07/2019 02:00 AM    CREA 1.00 04/07/2019 02:00 AM    GFRAA >60 04/07/2019 02:00 AM    GFRNA 53 (L) 04/07/2019 02:00 AM     CBC:   Lab Results   Component Value Date/Time    WBC 7.4 04/07/2019 02:00 AM    HGB 11.3 (L) 04/07/2019 02:00 AM    HCT 35.2 04/07/2019 02:00 AM     04/07/2019 02:00 AM         Physical Exam on Discharge:  Visit Vitals  /70 Pulse 76   Temp 98 °F (36.7 °C)   Resp 18   Ht 5' 4\" (1.626 m)   Wt 60.1 kg (132 lb 9.6 oz)   SpO2 95%   BMI 22.76 kg/m²     Body mass index is 22.76 kg/m². GEN: NAD  HEART: S1 S2  NEURO: nonfocal   Condition at discharge: stable    Discharge Medications  Current Discharge Medication List      CONTINUE these medications which have CHANGED    Details   dilTIAZem CD (CARDIZEM CD) 240 mg ER capsule Take 1 Cap by mouth daily. Qty: 30 Cap, Refills: 0      metoprolol tartrate (LOPRESSOR) 100 mg IR tablet Take 1 Tab by mouth two (2) times a day. Qty: 60 Tab, Refills: 0      spironolactone (ALDACTONE) 25 mg tablet Take 0.5 Tabs by mouth daily. Qty: 30 Tab, Refills: 0      furosemide (LASIX) 40 mg tablet Take 1 Tab by mouth daily. Qty: 30 Tab, Refills: 0         CONTINUE these medications which have NOT CHANGED    Details   apixaban (ELIQUIS PO) Take 2.5 mg by mouth two (2) times a day.          STOP taking these medications       LISINOPRIL, BULK, Comments:   Reason for Stopping:               Disposition: home w home health   Follow up:  Pcp, cardiology  Restrictions: 1.5 liter fluid restriction    Diagnostic Imaging/Labs pending at discharge: none      Lyndon Baptiste, [address] [facility]  Internal Medicine/Geriatrics    Time Spent 40 inutes with >50% coordination of care          CC: Jaime Damian MD

## 2019-04-07 NOTE — DISCHARGE INSTRUCTIONS
Patient Education        Atrial Fibrillation: Care Instructions  Your Care Instructions    Atrial fibrillation is an irregular and often fast heartbeat. Treating this condition is important for several reasons. It can cause blood clots, which can travel from your heart to your brain and cause a stroke. If you have a fast heartbeat, you may feel lightheaded, dizzy, and weak. An irregular heartbeat can also increase your risk for heart failure. Atrial fibrillation is often the result of another heart condition, such as high blood pressure or coronary artery disease. Making changes to improve your heart condition will help you stay healthy and active. Follow-up care is a key part of your treatment and safety. Be sure to make and go to all appointments, and call your doctor if you are having problems. It's also a good idea to know your test results and keep a list of the medicines you take. How can you care for yourself at home? Medicines    · Take your medicines exactly as prescribed. Call your doctor if you think you are having a problem with your medicine. You will get more details on the specific medicines your doctor prescribes.     · If your doctor has given you a blood thinner to prevent a stroke, be sure you get instructions about how to take your medicine safely. Blood thinners can cause serious bleeding problems.     · Do not take any vitamins, over-the-counter drugs, or herbal products without talking to your doctor first.    Lifestyle changes    · Do not smoke. Smoking can increase your chance of a stroke and heart attack. If you need help quitting, talk to your doctor about stop-smoking programs and medicines. These can increase your chances of quitting for good.     · Eat a heart-healthy diet.     · Stay at a healthy weight. Lose weight if you need to.     · Limit alcohol to 2 drinks a day for men and 1 drink a day for women. Too much alcohol can cause health problems.     · Avoid colds and flu.  Get a pneumococcal vaccine shot. If you have had one before, ask your doctor whether you need another dose. Get a flu shot every year. If you must be around people with colds or flu, wash your hands often. Activity    · If your doctor recommends it, get more exercise. Walking is a good choice. Bit by bit, increase the amount you walk every day. Try for at least 30 minutes on most days of the week. You also may want to swim, bike, or do other activities. Your doctor may suggest that you join a cardiac rehabilitation program so that you can have help increasing your physical activity safely.     · Start light exercise if your doctor says it is okay. Even a small amount will help you get stronger, have more energy, and manage stress. Walking is an easy way to get exercise. Start out by walking a little more than you did in the hospital. Gradually increase the amount you walk.     · When you exercise, watch for signs that your heart is working too hard. You are pushing too hard if you cannot talk while you are exercising. If you become short of breath or dizzy or have chest pain, sit down and rest immediately.     · Check your pulse regularly. Place two fingers on the artery at the palm side of your wrist, in line with your thumb. If your heartbeat seems uneven or fast, talk to your doctor. When should you call for help? Call 911 anytime you think you may need emergency care. For example, call if:    · You have symptoms of a heart attack. These may include:  ? Chest pain or pressure, or a strange feeling in the chest.  ? Sweating. ? Shortness of breath. ? Nausea or vomiting. ? Pain, pressure, or a strange feeling in the back, neck, jaw, or upper belly or in one or both shoulders or arms. ? Lightheadedness or sudden weakness. ? A fast or irregular heartbeat. After you call 911, the  may tell you to chew 1 adult-strength or 2 to 4 low-dose aspirin. Wait for an ambulance.  Do not try to drive yourself.     · You have symptoms of a stroke. These may include:  ? Sudden numbness, tingling, weakness, or loss of movement in your face, arm, or leg, especially on only one side of your body. ? Sudden vision changes. ? Sudden trouble speaking. ? Sudden confusion or trouble understanding simple statements. ? Sudden problems with walking or balance. ? A sudden, severe headache that is different from past headaches.     · You passed out (lost consciousness).    Call your doctor now or seek immediate medical care if:    · You have new or increased shortness of breath.     · You feel dizzy or lightheaded, or you feel like you may faint.     · Your heart rate becomes irregular.     · You can feel your heart flutter in your chest or skip heartbeats. Tell your doctor if these symptoms are new or worse.    Watch closely for changes in your health, and be sure to contact your doctor if you have any problems. Where can you learn more? Go to http://marcela-sherwin.info/. Enter U020 in the search box to learn more about \"Atrial Fibrillation: Care Instructions. \"  Current as of: July 22, 2018  Content Version: 11.9  © 9224-9967 [employer]. Care instructions adapted under license by [employer] (which disclaims liability or warranty for this information). If you have questions about a medical condition or this instruction, always ask your healthcare professional. [address] any warranty or liability for your use of this information. Patient Education        Heart Failure: Care Instructions  Your Care Instructions    Heart failure occurs when your heart does not pump as much blood as the body needs. Failure does not mean that the heart has stopped pumping but rather that it is not pumping as well as it should. Over time, this causes fluid buildup in your lungs and other parts of your body.  Fluid buildup can cause shortness of breath, fatigue, swollen ankles, and other problems. By taking medicines regularly, reducing sodium (salt) in your diet, checking your weight every day, and making lifestyle changes, you can feel better and live longer. Follow-up care is a key part of your treatment and safety. Be sure to make and go to all appointments, and call your doctor if you are having problems. It's also a good idea to know your test results and keep a list of the medicines you take. How can you care for yourself at home? Medicines    · Be safe with medicines. Take your medicines exactly as prescribed. Call your doctor if you think you are having a problem with your medicine.     · Do not take any vitamins, over-the-counter medicine, or herbal products without talking to your doctor first. Link Goyo not take ibuprofen (Advil or Motrin) and naproxen (Aleve) without talking to your doctor first. They could make your heart failure worse.     · You may be taking some of the following medicine. ? Beta-blockers can slow heart rate, decrease blood pressure, and improve your condition. Taking a beta-blocker may lower your chance of needing to be hospitalized. ? Angiotensin-converting enzyme inhibitors (ACEIs) reduce the heart's workload, lower blood pressure, and reduce swelling. Taking an ACEI may lower your chance of needing to be hospitalized again. ? Angiotensin II receptor blockers (ARBs) work like ACEIs. Your doctor may prescribe them instead of ACEIs. ? Diuretics, also called water pills, reduce swelling. ? Potassium supplements replace this important mineral, which is sometimes lost with diuretics. ? Aspirin and other blood thinners prevent blood clots, which can cause a stroke or heart attack.    You will get more details on the specific medicines your doctor prescribes. Diet    · Your doctor may suggest that you limit sodium to 2,000 milligrams (mg) a day or less. That is less than 1 teaspoon of salt a day, including all the salt you eat in cooking or in packaged foods. People get most of their sodium from processed foods. Fast food and restaurant meals also tend to be very high in sodium.     · Ask your doctor how much liquid you can drink each day. You may have to limit liquids.    Weight    · Weigh yourself without clothing at the same time each day. Record your weight. Call your doctor if you have a sudden weight gain, such as more than 2 to 3 pounds in a day or 5 pounds in a week. (Your doctor may suggest a different range of weight gain.) A sudden weight gain may mean that your heart failure is getting worse.    Activity level    · Start light exercise (if your doctor says it is okay). Even if you can only do a small amount, exercise will help you get stronger, have more energy, and manage your weight and your stress. Walking is an easy way to get exercise. Start out by walking a little more than you did before. Bit by bit, increase the amount you walk.     · When you exercise, watch for signs that your heart is working too hard. You are pushing yourself too hard if you cannot talk while you are exercising. If you become short of breath or dizzy or have chest pain, stop, sit down, and rest.     · If you feel \"wiped out\" the day after you exercise, walk slower or for a shorter distance until you can work up to a better pace.     · Get enough rest at night. Sleeping with 1 or 2 pillows under your upper body and head may help you breathe easier.    Lifestyle changes    · Do not smoke. Smoking can make a heart condition worse. If you need help quitting, talk to your doctor about stop-smoking programs and medicines. These can increase your chances of quitting for good. Quitting smoking may be the most important step you can take to protect your heart.     · Limit alcohol to 2 drinks a day for men and 1 drink a day for women. Too much alcohol can cause health problems.     · Avoid getting sick from colds and the flu. Get a pneumococcal vaccine shot.  If you have had one before, ask your doctor whether you need another dose. Get a flu shot each year. If you must be around people with colds or the flu, wash your hands often. When should you call for help? Call 911 if you have symptoms of sudden heart failure such as:    · You have severe trouble breathing.     · You cough up pink, foamy mucus.     · You have a new irregular or rapid heartbeat.    Call your doctor now or seek immediate medical care if:    · You have new or increased shortness of breath.     · You are dizzy or lightheaded, or you feel like you may faint.     · You have sudden weight gain, such as more than 2 to 3 pounds in a day or 5 pounds in a week. (Your doctor may suggest a different range of weight gain.)     · You have increased swelling in your legs, ankles, or feet.     · You are suddenly so tired or weak that you cannot do your usual activities.    Watch closely for changes in your health, and be sure to contact your doctor if you develop new symptoms. Where can you learn more? Go to http://marcela-sherwin.info/. Enter E959 in the search box to learn more about \"Heart Failure: Care Instructions. \"  Current as of: July 22, 2018  Content Version: 11.9  © 9949-7353 [employer] Care instructions adapted under license by [employer] (which disclaims liability or warranty for this information). If you have questions about a medical condition or this instruction, always ask your healthcare professional. [place] [address] any warranty or liability for your use of this information. Patient Education        Shortness of Breath: Care Instructions  Your Care Instructions  Shortness of breath has many causes. Sometimes conditions such as anxiety can lead to shortness of breath. Some people get mild shortness of breath when they exercise.  Trouble breathing also can be a symptom of a serious problem, such as asthma, lung disease, emphysema, heart problems, and pneumonia. If your shortness of breath continues, you may need tests and treatment. Watch for any changes in your breathing and other symptoms. Follow-up care is a key part of your treatment and safety. Be sure to make and go to all appointments, and call your doctor if you are having problems. It's also a good idea to know your test results and keep a list of the medicines you take. How can you care for yourself at home? · Do not smoke or allow others to smoke around you. If you need help quitting, talk to your doctor about stop-smoking programs and medicines. These can increase your chances of quitting for good. · Get plenty of rest and sleep. · Take your medicines exactly as prescribed. Call your doctor if you think you are having a problem with your medicine. · Find healthy ways to deal with stress. ? Exercise daily. ? Get plenty of sleep. ? Eat regularly and well. When should you call for help? Call 911 anytime you think you may need emergency care. For example, call if:    · You have severe shortness of breath.     · You have symptoms of a heart attack. These may include:  ? Chest pain or pressure, or a strange feeling in the chest.  ? Sweating. ? Shortness of breath. ? Nausea or vomiting. ? Pain, pressure, or a strange feeling in the back, neck, jaw, or upper belly or in one or both shoulders or arms. ? Lightheadedness or sudden weakness. ? A fast or irregular heartbeat. After you call 911, the  may tell you to chew 1 adult-strength or 2 to 4 low-dose aspirin. Wait for an ambulance. Do not try to drive yourself.    Call your doctor now or seek immediate medical care if:    · Your shortness of breath gets worse or you start to wheeze.  Wheezing is a high-pitched sound when you breathe.     · You wake up at night out of breath or have to prop your head up on several pillows to breathe.     · You are short of breath after only light activity or while at rest.    Watch closely for changes in your health, and be sure to contact your doctor if:    · You do not get better over the next 1 to 2 days. Where can you learn more? Go to http://marcela-sherwin.info/. Enter S780 in the search box to learn more about \"Shortness of Breath: Care Instructions. \"  Current as of: September 5, 2018  Content Version: 11.9  © 3566-8828 [employer]. Care instructions adapted under license by [employer] (which disclaims liability or warranty for this information). If you have questions about a medical condition or this instruction, always ask your healthcare professional. [place] [address] any warranty or liability for your use of this information. DISCHARGE SUMMARY from Nurse    PATIENT INSTRUCTIONS:    After general anesthesia or intravenous sedation, for 24 hours or while taking prescription Narcotics:  · Limit your activities  · Do not drive and operate hazardous machinery  · Do not make important personal or business decisions  · Do  not drink alcoholic beverages  · If you have not urinated within 8 hours after discharge, please contact your surgeon on call. Report the following to your surgeon:  · Excessive pain, swelling, redness or odor of or around the surgical area  · Temperature over 100.5  · Nausea and vomiting lasting longer than 4 hours or if unable to take medications  · Any signs of decreased circulation or nerve impairment to extremity: change in color, persistent  numbness, tingling, coldness or increase pain  · Any questions    What to do at Home:    *  Please give a list of your current medications to your Primary Care Provider. *  Please update this list whenever your medications are discontinued, doses are      changed, or new medications (including over-the-counter products) are added. *  Please carry medication information at all times in case of emergency situations.     These are general instructions for a healthy lifestyle:    No smoking/ No tobacco products/ Avoid exposure to second hand smoke  Surgeon General's Warning:  Quitting smoking now greatly reduces serious risk to your health. Obesity, smoking, and sedentary lifestyle greatly increases your risk for illness    A healthy diet, regular physical exercise & weight monitoring are important for maintaining a healthy lifestyle    You may be retaining fluid if you have a history of heart failure or if you experience any of the following symptoms:  Weight gain of 3 pounds or more overnight or 5 pounds in a week, increased swelling in our hands or feet or shortness of breath while lying flat in bed. Please call your doctor as soon as you notice any of these symptoms; do not wait until your next office visit. Recognize signs and symptoms of STROKE:    F-face looks uneven    A-arms unable to move or move unevenly    S-speech slurred or non-existent    T-time-call 911 as soon as signs and symptoms begin-DO NOT go       Back to bed or wait to see if you get better-TIME IS BRAIN. Warning Signs of HEART ATTACK     Call 911 if you have these symptoms:   Chest discomfort. Most heart attacks involve discomfort in the center of the chest that lasts more than a few minutes, or that goes away and comes back. It can feel like uncomfortable pressure, squeezing, fullness, or pain.  Discomfort in other areas of the upper body. Symptoms can include pain or discomfort in one or both arms, the back, neck, jaw, or stomach.  Shortness of breath with or without chest discomfort.  Other signs may include breaking out in a cold sweat, nausea, or lightheadedness. Don't wait more than five minutes to call 911 - MINUTES MATTER! Fast action can save your life. Calling 911 is almost always the fastest way to get lifesaving treatment. Emergency Medical Services staff can begin treatment when they arrive -- up to an hour sooner than if someone gets to the hospital by car.      The discharge information has been reviewed with the patient and caregiver. The patient and caregiver verbalized understanding. Discharge medications reviewed with the patient and caregiver and appropriate educational materials and side effects teaching were provided.   ___________________________________________________________________________________________________________________________________

## 2019-04-07 NOTE — ROUTINE PROCESS
Dual AVS reviewed with ERNESTINA Mccarty.  All medications reviewed individually with patient. Opportunities for questions and concerns provided. Patient discharged via (mode of transport ie. Car, ambulance or air transport) Car  Patient's arm band appropriately discarded.

## 2019-04-07 NOTE — PROGRESS NOTES
DC Plan: Discharge home today with [facility], MD follow up, family assistance Chart reviewed. Pt admitted by hospitalist for CHF, A fib. Discharge order for today noted. Met with pt, pts daughter and son in law at bedside. Primary RN [employer] present. PT recommending HH. FOC offered for [facility] and pts daughter chose [facility], stating pt was active with them at one point. Referral will be placed. Pt lives alone. Pt drives. Pts PCP [place]. Pt eager to dc. No immediate dc concerns identified. CM will cont to be available. Care Management Interventions PCP Verified by CM: Yes Mode of Transport at Discharge: Other (see comment)(daughter) Transition of Care Consult (CM Consult): Discharge Planning Physical Therapy Consult: Yes Current Support Network: Lives Alone, Own Home Confirm Follow Up Transport: Self Plan discussed with Pt/Family/Caregiver: Yes Freedom of Choice Offered: Yes Discharge Location Discharge Placement: Home with home health

## 2019-04-07 NOTE — PROGRESS NOTES
Problem: Afib Pathway: Day 1 Goal: Off Pathway (Use only if patient is Off Pathway) Outcome: Progressing Towards Goal 
Goal: Activity/Safety Outcome: Progressing Towards Goal 
Goal: Consults, if ordered Outcome: Progressing Towards Goal 
Goal: Diagnostic Test/Procedures Outcome: Progressing Towards Goal 
Goal: Nutrition/Diet Outcome: Progressing Towards Goal 
Goal: Discharge Planning Outcome: Progressing Towards Goal 
Goal: Medications Outcome: Progressing Towards Goal 
Goal: Respiratory Outcome: Progressing Towards Goal 
  
Problem: Pain Goal: *Control of Pain Outcome: Progressing Towards Goal 
  
Problem: Falls - Risk of 
Goal: *Absence of Falls Description Document Earnestine Bravo Fall Risk and appropriate interventions in the flowsheet. Outcome: Progressing Towards Goal 
  
Problem: Patient Education: Go to Patient Education Activity Goal: Patient/Family Education Outcome: Progressing Towards Goal

## 2019-04-07 NOTE — PROGRESS NOTES
Pt refused PT due to: 
[]  Nausea/vomiting 
[]  Eating 
[]  Pain 
[]  Pt lethargic [x]  Other, Pt being d/c from hospital, pt declined PT session at this time. Will f/u later if plan changes. Thank you.  
Boni Johnston, PT

## 2019-04-07 NOTE — PROGRESS NOTES
58- Pt requested Tylenol for back pain due to arthritis. 0630- Pt woke up and she is very confused. Trying to leave the room, unable to reorient and redirect the Pt. Daughter called but didn't answer the phone. Pt was able to call family member to come and stay with her. Pt sitting on chair, on coming nurse made aware Pt needs frequent monitoring.

## 2019-04-08 NOTE — PROGRESS NOTES
[address] telephone call from [facility] states they are not able to accommodate patient at this time it would be on 4/22 the earliest. Cm has spoken with patients daughter Serena Rick. She is aware and would like to change to [facility]. Cms will place referral as requested

## 2019-04-11 LAB
BACTERIA SPEC CULT: NORMAL
BACTERIA SPEC CULT: NORMAL
SERVICE CMNT-IMP: NORMAL
SERVICE CMNT-IMP: NORMAL